# Patient Record
Sex: MALE | Race: WHITE | NOT HISPANIC OR LATINO | URBAN - METROPOLITAN AREA
[De-identification: names, ages, dates, MRNs, and addresses within clinical notes are randomized per-mention and may not be internally consistent; named-entity substitution may affect disease eponyms.]

---

## 2018-05-08 ENCOUNTER — OUTPATIENT (OUTPATIENT)
Dept: OUTPATIENT SERVICES | Facility: HOSPITAL | Age: 53
LOS: 1 days | End: 2018-05-08
Payer: COMMERCIAL

## 2018-05-08 VITALS
HEIGHT: 73 IN | RESPIRATION RATE: 16 BRPM | WEIGHT: 235.89 LBS | DIASTOLIC BLOOD PRESSURE: 66 MMHG | SYSTOLIC BLOOD PRESSURE: 122 MMHG | TEMPERATURE: 97 F | HEART RATE: 82 BPM

## 2018-05-08 DIAGNOSIS — Z01.818 ENCOUNTER FOR OTHER PREPROCEDURAL EXAMINATION: ICD-10-CM

## 2018-05-08 LAB
ANION GAP SERPL CALC-SCNC: 16 MMOL/L — SIGNIFICANT CHANGE UP (ref 5–17)
APTT BLD: 37.5 SEC — HIGH (ref 27.5–37.4)
BASOPHILS # BLD AUTO: 0 K/UL — SIGNIFICANT CHANGE UP (ref 0–0.2)
BASOPHILS NFR BLD AUTO: 0.1 % — SIGNIFICANT CHANGE UP (ref 0–2)
BUN SERPL-MCNC: 16 MG/DL — SIGNIFICANT CHANGE UP (ref 8–20)
CALCIUM SERPL-MCNC: 9.2 MG/DL — SIGNIFICANT CHANGE UP (ref 8.6–10.2)
CHLORIDE SERPL-SCNC: 102 MMOL/L — SIGNIFICANT CHANGE UP (ref 98–107)
CO2 SERPL-SCNC: 25 MMOL/L — SIGNIFICANT CHANGE UP (ref 22–29)
CREAT SERPL-MCNC: 0.98 MG/DL — SIGNIFICANT CHANGE UP (ref 0.5–1.3)
EOSINOPHIL # BLD AUTO: 0.1 K/UL — SIGNIFICANT CHANGE UP (ref 0–0.5)
EOSINOPHIL NFR BLD AUTO: 1 % — SIGNIFICANT CHANGE UP (ref 0–5)
GLUCOSE SERPL-MCNC: 129 MG/DL — HIGH (ref 70–115)
HCT VFR BLD CALC: 46.3 % — SIGNIFICANT CHANGE UP (ref 42–52)
HGB BLD-MCNC: 15.4 G/DL — SIGNIFICANT CHANGE UP (ref 14–18)
INR BLD: 1.04 RATIO — SIGNIFICANT CHANGE UP (ref 0.88–1.16)
LYMPHOCYTES # BLD AUTO: 2 K/UL — SIGNIFICANT CHANGE UP (ref 1–4.8)
LYMPHOCYTES # BLD AUTO: 28.7 % — SIGNIFICANT CHANGE UP (ref 20–55)
MCHC RBC-ENTMCNC: 29.5 PG — SIGNIFICANT CHANGE UP (ref 27–31)
MCHC RBC-ENTMCNC: 33.3 G/DL — SIGNIFICANT CHANGE UP (ref 32–36)
MCV RBC AUTO: 88.7 FL — SIGNIFICANT CHANGE UP (ref 80–94)
MONOCYTES # BLD AUTO: 0.5 K/UL — SIGNIFICANT CHANGE UP (ref 0–0.8)
MONOCYTES NFR BLD AUTO: 7 % — SIGNIFICANT CHANGE UP (ref 3–10)
NEUTROPHILS # BLD AUTO: 4.5 K/UL — SIGNIFICANT CHANGE UP (ref 1.8–8)
NEUTROPHILS NFR BLD AUTO: 62.9 % — SIGNIFICANT CHANGE UP (ref 37–73)
PLATELET # BLD AUTO: 233 K/UL — SIGNIFICANT CHANGE UP (ref 150–400)
POTASSIUM SERPL-MCNC: 3.8 MMOL/L — SIGNIFICANT CHANGE UP (ref 3.5–5.3)
POTASSIUM SERPL-SCNC: 3.8 MMOL/L — SIGNIFICANT CHANGE UP (ref 3.5–5.3)
PROTHROM AB SERPL-ACNC: 11.4 SEC — SIGNIFICANT CHANGE UP (ref 9.8–12.7)
RBC # BLD: 5.22 M/UL — SIGNIFICANT CHANGE UP (ref 4.6–6.2)
RBC # FLD: 13.2 % — SIGNIFICANT CHANGE UP (ref 11–15.6)
SODIUM SERPL-SCNC: 143 MMOL/L — SIGNIFICANT CHANGE UP (ref 135–145)
WBC # BLD: 7.1 K/UL — SIGNIFICANT CHANGE UP (ref 4.8–10.8)
WBC # FLD AUTO: 7.1 K/UL — SIGNIFICANT CHANGE UP (ref 4.8–10.8)

## 2018-05-08 PROCEDURE — 80048 BASIC METABOLIC PNL TOTAL CA: CPT

## 2018-05-08 PROCEDURE — 85610 PROTHROMBIN TIME: CPT

## 2018-05-08 PROCEDURE — 85730 THROMBOPLASTIN TIME PARTIAL: CPT

## 2018-05-08 PROCEDURE — 93005 ELECTROCARDIOGRAM TRACING: CPT

## 2018-05-08 PROCEDURE — 36415 COLL VENOUS BLD VENIPUNCTURE: CPT

## 2018-05-08 PROCEDURE — 93010 ELECTROCARDIOGRAM REPORT: CPT

## 2018-05-08 PROCEDURE — 85027 COMPLETE CBC AUTOMATED: CPT

## 2018-05-08 PROCEDURE — G0463: CPT

## 2018-05-08 RX ORDER — SODIUM CHLORIDE 9 MG/ML
3 INJECTION INTRAMUSCULAR; INTRAVENOUS; SUBCUTANEOUS ONCE
Qty: 0 | Refills: 0 | Status: DISCONTINUED | OUTPATIENT
Start: 2018-05-14 | End: 2018-05-29

## 2018-05-08 RX ORDER — CEFAZOLIN SODIUM 1 G
2000 VIAL (EA) INJECTION ONCE
Qty: 0 | Refills: 0 | Status: DISCONTINUED | OUTPATIENT
Start: 2018-05-14 | End: 2018-05-29

## 2018-05-08 RX ORDER — SODIUM CHLORIDE 9 MG/ML
1000 INJECTION, SOLUTION INTRAVENOUS
Qty: 0 | Refills: 0 | Status: DISCONTINUED | OUTPATIENT
Start: 2018-05-14 | End: 2018-05-29

## 2018-05-08 NOTE — H&P PST ADULT - RS GEN PE MLT RESP DETAILS PC
good air movement/respirations non-labored/airway patent/clear to auscultation bilaterally/breath sounds equal/no chest wall tenderness

## 2018-05-08 NOTE — ASU PATIENT PROFILE, ADULT - NS PRO MODE OF ARRIVAL
Patient comes in for programming evaluation for his defibrillator. All sensing and pacing parameters are within normal range. No changes need to be made at this time. Please see interrogation for more detail. Patient will follow up in 3 months in office or remotely.     SALLIE akhtar ambulatory

## 2018-05-08 NOTE — H&P PST ADULT - ASSESSMENT
53 year old male schedule for an umbilical hernia repair with possible mesh.     CAPRINI SCORE [CLOT]    AGE RELATED RISK FACTORS                                                       MOBILITY RELATED FACTORS  [x ] Age 41-60 years                                            (1 Point)                  [ ] Bed rest                                                        (1 Point)  [ ] Age: 61-74 years                                           (2 Points)                 [ ] Plaster cast                                                   (2 Points)  [ ] Age= 75 years                                              (3 Points)                 [ ] Bed bound for more than 72 hours                 (2 Points)    DISEASE RELATED RISK FACTORS                                               GENDER SPECIFIC FACTORS  [ ] Edema in the lower extremities                       (1 Point)                  [ ] Pregnancy                                                     (1 Point)  [ ] Varicose veins                                               (1 Point)                  [ ] Post-partum < 6 weeks                                   (1 Point)             [x ] BMI > 25 Kg/m2                                            (1 Point)                  [ ] Hormonal therapy  or oral contraception          (1 Point)                 [ ] Sepsis (in the previous month)                        (1 Point)                  [ ] History of pregnancy complications                 (1 point)  [ ] Pneumonia or serious lung disease                                               [ ] Unexplained or recurrent                     (1 Point)           (in the previous month)                               (1 Point)  [ ] Abnormal pulmonary function test                     (1 Point)                 SURGERY RELATED RISK FACTORS  [ ] Acute myocardial infarction                              (1 Point)                 [ ]  Section                                             (1 Point)  [ ] Congestive heart failure (in the previous month)  (1 Point)               [ ] Minor surgery                                                  (1 Point)   [ ] Inflammatory bowel disease                             (1 Point)                 [ ] Arthroscopic surgery                                        (2 Points)  [ ] Central venous access                                      (2 Points)                [ x] General surgery lasting more than 45 minutes   (2 Points)       [ ] Stroke (in the previous month)                          (5 Points)               [ ] Elective arthroplasty                                         (5 Points)                                                                                                                                               HEMATOLOGY RELATED FACTORS                                                 TRAUMA RELATED RISK FACTORS  [ ] Prior episodes of VTE                                     (3 Points)                 [ ] Fracture of the hip, pelvis, or leg                       (5 Points)  [ ] Positive family history for VTE                         (3 Points)                 [ ] Acute spinal cord injury (in the previous month)  (5 Points)  [ ] Prothrombin 75614 A                                     (3 Points)                 [ ] Paralysis  (less than 1 month)                             (5 Points)  [ ] Factor V Leiden                                             (3 Points)                  [ ] Multiple Trauma within 1 month                        (5 Points)  [ ] Lupus anticoagulants                                     (3 Points)                                                           [ ] Anticardiolipin antibodies                               (3 Points)                                                       [ ] High homocysteine in the blood                      (3 Points)                                             [ ] Other congenital or acquired thrombophilia      (3 Points)                                                [ ] Heparin induced thrombocytopenia                  (3 Points)                                          Total Score [ 4 ]

## 2018-05-08 NOTE — H&P PST ADULT - FAMILY HISTORY
Father  Still living? Unknown  Family history of stroke, Age at diagnosis: Age Unknown     Mother  Still living? Unknown  Family history of diabetes mellitus (DM), Age at diagnosis: Age Unknown

## 2018-05-08 NOTE — H&P PST ADULT - HISTORY OF PRESENT ILLNESS
53 year old male scheule for anumbilical hernia repari with possible mesh 53 year old male schedule for an umbilical hernia repair with possible mesh.

## 2018-05-13 ENCOUNTER — TRANSCRIPTION ENCOUNTER (OUTPATIENT)
Age: 53
End: 2018-05-13

## 2018-05-14 ENCOUNTER — RESULT REVIEW (OUTPATIENT)
Age: 53
End: 2018-05-14

## 2018-05-14 ENCOUNTER — OUTPATIENT (OUTPATIENT)
Dept: OUTPATIENT SERVICES | Facility: HOSPITAL | Age: 53
LOS: 1 days | End: 2018-05-14
Payer: COMMERCIAL

## 2018-05-14 VITALS
OXYGEN SATURATION: 96 % | HEART RATE: 68 BPM | RESPIRATION RATE: 16 BRPM | SYSTOLIC BLOOD PRESSURE: 130 MMHG | DIASTOLIC BLOOD PRESSURE: 70 MMHG

## 2018-05-14 DIAGNOSIS — Z98.890 OTHER SPECIFIED POSTPROCEDURAL STATES: Chronic | ICD-10-CM

## 2018-05-14 DIAGNOSIS — Z01.818 ENCOUNTER FOR OTHER PREPROCEDURAL EXAMINATION: ICD-10-CM

## 2018-05-14 DIAGNOSIS — K42.9 UMBILICAL HERNIA WITHOUT OBSTRUCTION OR GANGRENE: ICD-10-CM

## 2018-05-14 DIAGNOSIS — Z29.9 ENCOUNTER FOR PROPHYLACTIC MEASURES, UNSPECIFIED: ICD-10-CM

## 2018-05-14 PROCEDURE — 88302 TISSUE EXAM BY PATHOLOGIST: CPT | Mod: 26

## 2018-05-14 PROCEDURE — 49585: CPT

## 2018-05-14 PROCEDURE — 88302 TISSUE EXAM BY PATHOLOGIST: CPT

## 2018-05-14 RX ORDER — ONDANSETRON 8 MG/1
4 TABLET, FILM COATED ORAL ONCE
Qty: 0 | Refills: 0 | Status: DISCONTINUED | OUTPATIENT
Start: 2018-05-14 | End: 2018-05-14

## 2018-05-14 RX ORDER — OXYCODONE AND ACETAMINOPHEN 5; 325 MG/1; MG/1
1 TABLET ORAL
Qty: 12 | Refills: 0
Start: 2018-05-14

## 2018-05-14 RX ORDER — SODIUM CHLORIDE 9 MG/ML
1000 INJECTION, SOLUTION INTRAVENOUS
Qty: 0 | Refills: 0 | Status: DISCONTINUED | OUTPATIENT
Start: 2018-05-14 | End: 2018-05-14

## 2018-05-14 RX ORDER — FENTANYL CITRATE 50 UG/ML
50 INJECTION INTRAVENOUS
Qty: 0 | Refills: 0 | Status: DISCONTINUED | OUTPATIENT
Start: 2018-05-14 | End: 2018-05-14

## 2018-05-14 RX ADMIN — FENTANYL CITRATE 50 MICROGRAM(S): 50 INJECTION INTRAVENOUS at 12:39

## 2018-05-14 NOTE — ASU DISCHARGE PLAN (ADULT/PEDIATRIC). - MEDICATION SUMMARY - MEDICATIONS TO TAKE
I will START or STAY ON the medications listed below when I get home from the hospital:    Percocet 5/325 oral tablet  -- 1 tab(s) by mouth every 6 hours MDD:4  -- Caution federal law prohibits the transfer of this drug to any person other  than the person for whom it was prescribed.  May cause drowsiness.  Alcohol may intensify this effect.  Use care when operating dangerous machinery.  This prescription cannot be refilled.  This product contains acetaminophen.  Do not use  with any other product containing acetaminophen to prevent possible liver damage.  Using more of this medication than prescribed may cause serious breathing problems.    -- Indication: For pain

## 2018-05-14 NOTE — BRIEF OPERATIVE NOTE - POST-OP DX
Umbilical hernia without obstruction and without gangrene  05/14/2018  umbilical inclusion  Active  Rodri Maria

## 2018-05-14 NOTE — ASU DISCHARGE PLAN (ADULT/PEDIATRIC). - NOTIFY
Pain not relieved by Medications/Numbness, color, or temperature change to extremity/Persistent Nausea and Vomiting/Bleeding that does not stop

## 2018-05-14 NOTE — BRIEF OPERATIVE NOTE - PROCEDURE
<<-----Click on this checkbox to enter Procedure Umbilical herniorrhaphy using sutures  05/14/2018  excision intraabd inclusion  Active  RTUROFF

## 2018-05-22 LAB — SURGICAL PATHOLOGY FINAL REPORT - CH: SIGNIFICANT CHANGE UP

## 2020-04-06 NOTE — ASU PATIENT PROFILE, ADULT - ARRIVAL TIME
08:30

## 2020-10-09 NOTE — ASU DISCHARGE PLAN (ADULT/PEDIATRIC). - NS AS DC DISCHARGE ACCOMPANY
CERTIFICATE OF WORK    October 9, 2020      Re:   Magy Suazo  [id number] [address] [id number]                        This is to certify that Magy Suazo has been under my care from 10/9/2020 and is unable to return to work until further notice    RESTRICTIONS:  Patient is to self quarantine until COVID-19 test results are known      REMARKS:  Patient is out due to medical illness        SIGNATURE:___________________________________________,   10/9/2020      José Miguel Price MD        [address] [address] [place] [id number]  
Family

## 2021-04-06 NOTE — ASU PREOP CHECKLIST - ALLERGY BAND ON
Pt left a voice mail saying  "Can somebody call me"  Didn't give his name, , reason  Luckily the phone number was recorded  LMOM to please c/b if he needs Neuro  He has a Lois appt for later in the month 
no known allergies

## 2021-07-07 NOTE — ASU DISCHARGE PLAN (ADULT/PEDIATRIC). - LAUNCH MEDICATION RECONCILIATION
[address] [facility]  1949  [id number]    Have you had any Chest Pain that is not new? - No    Have you had any Shortness of Breath - No     Have you had any dizziness - No    Have you had any palpitations that are not new? - No    Do you have any edema - swelling in No      When did you have your last labs drawn   Where did you have them done   What doctor ordered     If we do not have these labs you are retrieve these labs for these providers!     Do you have a surgery or procedure scheduled in the near future - No <<-----Click here for Discharge Medication Review

## 2022-07-25 PROBLEM — E78.5 HYPERLIPIDEMIA, UNSPECIFIED: Chronic | Status: ACTIVE | Noted: 2018-05-08

## 2022-07-25 PROBLEM — M19.90 UNSPECIFIED OSTEOARTHRITIS, UNSPECIFIED SITE: Chronic | Status: ACTIVE | Noted: 2018-05-08

## 2022-08-12 ENCOUNTER — APPOINTMENT (OUTPATIENT)
Dept: ORTHOPEDIC SURGERY | Facility: CLINIC | Age: 57
End: 2022-08-12

## 2022-08-12 VITALS — HEIGHT: 73.5 IN | WEIGHT: 210 LBS | BODY MASS INDEX: 27.24 KG/M2

## 2022-08-12 PROCEDURE — 73030 X-RAY EXAM OF SHOULDER: CPT | Mod: LT

## 2022-08-12 PROCEDURE — 99204 OFFICE O/P NEW MOD 45 MIN: CPT

## 2022-08-12 NOTE — HISTORY OF PRESENT ILLNESS
[4] : 4 [Dull/Aching] : dull/aching [Constant] : constant [Meds] : meds [Heat] : heat [] : no [FreeTextEntry1] : left shoulder [FreeTextEntry5] : pt has been having left shoulder pain for years but has gotten more serious the past 2 years and more fatigued  [FreeTextEntry9] : aleve  [de-identified] : movement  [de-identified] : 03/2022 [de-identified] : , PCP [de-identified] : years ago [de-identified] : XR

## 2022-08-12 NOTE — IMAGING
[Left] : left shoulder [FreeTextEntry1] : There is a Type II Acromion.  There are AC changes.  The GH joint is OK.  There are GT changes on outside views.

## 2022-08-12 NOTE — REASON FOR VISIT
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  It can wake him at night.  He premedicates for golf with NSAIDs.  Reaching is sore and uncomfortable.  No numbness.

## 2022-08-12 NOTE — PHYSICAL EXAM
[Left] : left shoulder [Sitting] : sitting [Mild] : mild [5 ___] : forward flexion 5[unfilled]/5 [5___] : external rotation 5[unfilled]/5 [] : no sensory deficits [FreeTextEntry9] : IR to T10. [TWNoteComboBox4] : passive forward flexion 165 degrees [de-identified] : external rotation 75 degrees

## 2022-08-12 NOTE — CONSULT LETTER
[Dear  ___] : Dear  [unfilled], [Consult Letter:] : I had the pleasure of evaluating your patient, [unfilled]. [Please see my note below.] : Please see my note below. [Consult Closing:] : Thank you very much for allowing me to participate in the care of this patient.  If you have any questions, please do not hesitate to contact me. [Sincerely,] : Sincerely, [FreeTextEntry3] : Ben Duarte M.D.\par Shoulder Surgery\par

## 2022-08-12 NOTE — ASSESSMENT
[FreeTextEntry1] : We reviewed the findings and his options.\par His questions were answered.\par The Ac appears more of the issue.\par Medrol is prescribed.\par PT is planned.\par If sx persist, an injection vs MRI considered.

## 2022-09-26 ENCOUNTER — APPOINTMENT (OUTPATIENT)
Dept: ORTHOPEDIC SURGERY | Facility: CLINIC | Age: 57
End: 2022-09-26

## 2023-09-27 ENCOUNTER — APPOINTMENT (OUTPATIENT)
Dept: ORTHOPEDIC SURGERY | Facility: CLINIC | Age: 58
End: 2023-09-27
Payer: COMMERCIAL

## 2023-09-27 VITALS — HEIGHT: 73.5 IN | WEIGHT: 220 LBS | BODY MASS INDEX: 28.54 KG/M2

## 2023-09-27 PROCEDURE — 73030 X-RAY EXAM OF SHOULDER: CPT | Mod: LT

## 2023-09-27 PROCEDURE — 99214 OFFICE O/P EST MOD 30 MIN: CPT

## 2023-09-27 PROCEDURE — 73010 X-RAY EXAM OF SHOULDER BLADE: CPT | Mod: LT

## 2023-10-09 ENCOUNTER — APPOINTMENT (OUTPATIENT)
Dept: ORTHOPEDIC SURGERY | Facility: CLINIC | Age: 58
End: 2023-10-09
Payer: COMMERCIAL

## 2023-10-09 VITALS — HEIGHT: 73.5 IN | BODY MASS INDEX: 28.54 KG/M2 | WEIGHT: 220 LBS

## 2023-10-09 PROCEDURE — 99214 OFFICE O/P EST MOD 30 MIN: CPT

## 2023-10-20 ENCOUNTER — APPOINTMENT (OUTPATIENT)
Dept: ORTHOPEDIC SURGERY | Facility: CLINIC | Age: 58
End: 2023-10-20
Payer: COMMERCIAL

## 2023-10-20 PROCEDURE — L3670: CPT | Mod: LT

## 2023-10-24 ENCOUNTER — OUTPATIENT (OUTPATIENT)
Dept: OUTPATIENT SERVICES | Facility: HOSPITAL | Age: 58
LOS: 1 days | End: 2023-10-24
Payer: COMMERCIAL

## 2023-10-24 VITALS
WEIGHT: 229.94 LBS | RESPIRATION RATE: 15 BRPM | HEIGHT: 73 IN | HEART RATE: 71 BPM | TEMPERATURE: 98 F | OXYGEN SATURATION: 97 % | SYSTOLIC BLOOD PRESSURE: 133 MMHG | DIASTOLIC BLOOD PRESSURE: 82 MMHG

## 2023-10-24 DIAGNOSIS — M75.102 UNSPECIFIED ROTATOR CUFF TEAR OR RUPTURE OF LEFT SHOULDER, NOT SPECIFIED AS TRAUMATIC: ICD-10-CM

## 2023-10-24 DIAGNOSIS — M75.40 IMPINGEMENT SYNDROME OF UNSPECIFIED SHOULDER: ICD-10-CM

## 2023-10-24 DIAGNOSIS — Z98.890 OTHER SPECIFIED POSTPROCEDURAL STATES: Chronic | ICD-10-CM

## 2023-10-24 DIAGNOSIS — Z91.89 OTHER SPECIFIED PERSONAL RISK FACTORS, NOT ELSEWHERE CLASSIFIED: ICD-10-CM

## 2023-10-24 LAB
ANION GAP SERPL CALC-SCNC: 13 MMOL/L — SIGNIFICANT CHANGE UP (ref 7–14)
ANION GAP SERPL CALC-SCNC: 13 MMOL/L — SIGNIFICANT CHANGE UP (ref 7–14)
BUN SERPL-MCNC: 17 MG/DL — SIGNIFICANT CHANGE UP (ref 7–23)
BUN SERPL-MCNC: 17 MG/DL — SIGNIFICANT CHANGE UP (ref 7–23)
CALCIUM SERPL-MCNC: 9.5 MG/DL — SIGNIFICANT CHANGE UP (ref 8.4–10.5)
CALCIUM SERPL-MCNC: 9.5 MG/DL — SIGNIFICANT CHANGE UP (ref 8.4–10.5)
CHLORIDE SERPL-SCNC: 103 MMOL/L — SIGNIFICANT CHANGE UP (ref 98–107)
CHLORIDE SERPL-SCNC: 103 MMOL/L — SIGNIFICANT CHANGE UP (ref 98–107)
CO2 SERPL-SCNC: 27 MMOL/L — SIGNIFICANT CHANGE UP (ref 22–31)
CO2 SERPL-SCNC: 27 MMOL/L — SIGNIFICANT CHANGE UP (ref 22–31)
CREAT SERPL-MCNC: 1.13 MG/DL — SIGNIFICANT CHANGE UP (ref 0.5–1.3)
CREAT SERPL-MCNC: 1.13 MG/DL — SIGNIFICANT CHANGE UP (ref 0.5–1.3)
EGFR: 74 ML/MIN/1.73M2 — SIGNIFICANT CHANGE UP
EGFR: 74 ML/MIN/1.73M2 — SIGNIFICANT CHANGE UP
GLUCOSE SERPL-MCNC: 83 MG/DL — SIGNIFICANT CHANGE UP (ref 70–99)
GLUCOSE SERPL-MCNC: 83 MG/DL — SIGNIFICANT CHANGE UP (ref 70–99)
HCT VFR BLD CALC: 44.6 % — SIGNIFICANT CHANGE UP (ref 39–50)
HCT VFR BLD CALC: 44.6 % — SIGNIFICANT CHANGE UP (ref 39–50)
HGB BLD-MCNC: 14.8 G/DL — SIGNIFICANT CHANGE UP (ref 13–17)
HGB BLD-MCNC: 14.8 G/DL — SIGNIFICANT CHANGE UP (ref 13–17)
MCHC RBC-ENTMCNC: 29.8 PG — SIGNIFICANT CHANGE UP (ref 27–34)
MCHC RBC-ENTMCNC: 29.8 PG — SIGNIFICANT CHANGE UP (ref 27–34)
MCHC RBC-ENTMCNC: 33.2 GM/DL — SIGNIFICANT CHANGE UP (ref 32–36)
MCHC RBC-ENTMCNC: 33.2 GM/DL — SIGNIFICANT CHANGE UP (ref 32–36)
MCV RBC AUTO: 89.9 FL — SIGNIFICANT CHANGE UP (ref 80–100)
MCV RBC AUTO: 89.9 FL — SIGNIFICANT CHANGE UP (ref 80–100)
NRBC # BLD: 0 /100 WBCS — SIGNIFICANT CHANGE UP (ref 0–0)
NRBC # BLD: 0 /100 WBCS — SIGNIFICANT CHANGE UP (ref 0–0)
NRBC # FLD: 0 K/UL — SIGNIFICANT CHANGE UP (ref 0–0)
NRBC # FLD: 0 K/UL — SIGNIFICANT CHANGE UP (ref 0–0)
PLATELET # BLD AUTO: 262 K/UL — SIGNIFICANT CHANGE UP (ref 150–400)
PLATELET # BLD AUTO: 262 K/UL — SIGNIFICANT CHANGE UP (ref 150–400)
POTASSIUM SERPL-MCNC: 4.3 MMOL/L — SIGNIFICANT CHANGE UP (ref 3.5–5.3)
POTASSIUM SERPL-MCNC: 4.3 MMOL/L — SIGNIFICANT CHANGE UP (ref 3.5–5.3)
POTASSIUM SERPL-SCNC: 4.3 MMOL/L — SIGNIFICANT CHANGE UP (ref 3.5–5.3)
POTASSIUM SERPL-SCNC: 4.3 MMOL/L — SIGNIFICANT CHANGE UP (ref 3.5–5.3)
RBC # BLD: 4.96 M/UL — SIGNIFICANT CHANGE UP (ref 4.2–5.8)
RBC # BLD: 4.96 M/UL — SIGNIFICANT CHANGE UP (ref 4.2–5.8)
RBC # FLD: 12.5 % — SIGNIFICANT CHANGE UP (ref 10.3–14.5)
RBC # FLD: 12.5 % — SIGNIFICANT CHANGE UP (ref 10.3–14.5)
SODIUM SERPL-SCNC: 143 MMOL/L — SIGNIFICANT CHANGE UP (ref 135–145)
SODIUM SERPL-SCNC: 143 MMOL/L — SIGNIFICANT CHANGE UP (ref 135–145)
WBC # BLD: 6.98 K/UL — SIGNIFICANT CHANGE UP (ref 3.8–10.5)
WBC # BLD: 6.98 K/UL — SIGNIFICANT CHANGE UP (ref 3.8–10.5)
WBC # FLD AUTO: 6.98 K/UL — SIGNIFICANT CHANGE UP (ref 3.8–10.5)
WBC # FLD AUTO: 6.98 K/UL — SIGNIFICANT CHANGE UP (ref 3.8–10.5)

## 2023-10-24 PROCEDURE — 93010 ELECTROCARDIOGRAM REPORT: CPT

## 2023-10-24 NOTE — H&P PST ADULT - MUSCULOSKELETAL COMMENTS
pre op diagnosis - impingement of left shoulder b/l shoulder with pain and discomfort - L>R- patient failed conservative therapy

## 2023-10-24 NOTE — H&P PST ADULT - PATIENT ON (OXYGEN DELIVERY METHOD)
Counseled about the condition and also advised to monitor the symptoms and take ibuprofen for pain and also advised to go to ER or UC if symptoms get worse or do not improve,  
room air

## 2023-10-24 NOTE — H&P PST ADULT - NSICDXFAMILYHX_GEN_ALL_CORE_FT
FAMILY HISTORY:  Father  Still living? Unknown  Family history of stroke, Age at diagnosis: Age Unknown    Mother  Still living? Unknown  Family history of diabetes mellitus (DM), Age at diagnosis: Age Unknown

## 2023-10-24 NOTE — H&P PST ADULT - NSICDXPASTMEDICALHX_GEN_ALL_CORE_FT
PAST MEDICAL HISTORY:  HLD (hyperlipidemia)     Impingement syndrome of left shoulder     Lumbar herniated disc     OA (osteoarthritis)

## 2023-10-24 NOTE — H&P PST ADULT - HISTORY OF PRESENT ILLNESS
58 year old male, presents to [facility], with pre op diagnosis of  impingement syndrome of left shoulder, for pre op evaluation prior to decompression rotator cuff repair distal clavicle resection with Dr Chang

## 2023-10-24 NOTE — H&P PST ADULT - ASSESSMENT
58 year old male, presents to [facility], with pre op diagnosis of  impingement syndrome of left shoulder, for pre op evaluation prior to decompression rotator cuff repair distal clavicle resection with Dr Chang

## 2023-10-24 NOTE — H&P PST ADULT - PROBLEM SELECTOR PLAN 1
Patient is tentatively scheduled for  decompression rotator cuff repair distal clavicle resection with Dr Chang on 10/31/23.    Pre-op instructions provided. Pt given verbal and written instructions with teach back on chlorhexidine wash and pepcid. Pt verbalized understanding with return demonstration.    CBC, BMP sent

## 2023-10-30 ENCOUNTER — TRANSCRIPTION ENCOUNTER (OUTPATIENT)
Age: 58
End: 2023-10-30

## 2023-10-31 ENCOUNTER — TRANSCRIPTION ENCOUNTER (OUTPATIENT)
Age: 58
End: 2023-10-31

## 2023-10-31 ENCOUNTER — APPOINTMENT (OUTPATIENT)
Dept: ORTHOPEDIC SURGERY | Facility: AMBULATORY SURGERY CENTER | Age: 58
End: 2023-10-31
Payer: COMMERCIAL

## 2023-10-31 ENCOUNTER — OUTPATIENT (OUTPATIENT)
Dept: OUTPATIENT SERVICES | Facility: HOSPITAL | Age: 58
LOS: 1 days | Discharge: HOME | End: 2023-10-31

## 2023-10-31 VITALS
OXYGEN SATURATION: 100 % | HEART RATE: 91 BPM | RESPIRATION RATE: 21 BRPM | TEMPERATURE: 98 F | DIASTOLIC BLOOD PRESSURE: 80 MMHG | SYSTOLIC BLOOD PRESSURE: 130 MMHG

## 2023-10-31 VITALS
WEIGHT: 229.94 LBS | SYSTOLIC BLOOD PRESSURE: 147 MMHG | TEMPERATURE: 97 F | DIASTOLIC BLOOD PRESSURE: 80 MMHG | HEIGHT: 73 IN | HEART RATE: 69 BPM | OXYGEN SATURATION: 96 % | RESPIRATION RATE: 18 BRPM

## 2023-10-31 DIAGNOSIS — M75.102 UNSPECIFIED ROTATOR CUFF TEAR OR RUPTURE OF LEFT SHOULDER, NOT SPECIFIED AS TRAUMATIC: ICD-10-CM

## 2023-10-31 DIAGNOSIS — Z98.890 OTHER SPECIFIED POSTPROCEDURAL STATES: Chronic | ICD-10-CM

## 2023-10-31 PROCEDURE — 29824 SHO ARTHRS SRG DSTL CLAVICLC: CPT | Mod: 59,LT

## 2023-10-31 PROCEDURE — 29826 SHO ARTHRS SRG DECOMPRESSION: CPT | Mod: LT

## 2023-10-31 PROCEDURE — 29823 SHO ARTHRS SRG XTNSV DBRDMT: CPT | Mod: 59,LT

## 2023-10-31 PROCEDURE — 29827 SHO ARTHRS SRG RT8TR CUF RPR: CPT | Mod: LT

## 2023-10-31 DEVICE — ANCHOR PEEK SWIVLCK C 5.5X19.1MM: Type: IMPLANTABLE DEVICE | Site: LEFT | Status: FUNCTIONAL

## 2023-10-31 DEVICE — ANCHOR HEALIX ADVANCE 3 PEEK 5.5MM: Type: IMPLANTABLE DEVICE | Site: LEFT | Status: FUNCTIONAL

## 2023-10-31 RX ORDER — OXYCODONE AND ACETAMINOPHEN 5; 325 MG/1; MG/1
1 TABLET ORAL
Qty: 42 | Refills: 0
Start: 2023-10-31

## 2023-10-31 NOTE — ASU DISCHARGE PLAN (ADULT/PEDIATRIC) - CARE PROVIDER_API CALL
Ben Cook  Orthopaedic Surgery  [address] [place] [id number]  Phone: [phone]  Fax: [phone]  Follow Up Time: 1 week

## 2023-10-31 NOTE — ASU DISCHARGE PLAN (ADULT/PEDIATRIC) - NS MD DC FALL RISK RISK
For information on Fall & Injury Prevention, visit: [email or web address] OR  [email or web address] OR  https://www.cdc.gov/steadi/patient.html

## 2023-10-31 NOTE — ASU DISCHARGE PLAN (ADULT/PEDIATRIC) - MODE OF TRANSPORTATION
Initial OB;    Soy Zhu is 29 y.o.  female ,Patient's last menstrual period was 2021. at 17w3d. She denies current complaints.    Past OB history-  OB History    Para Term  AB Living   3 2 2     2   SAB TAB Ectopic Molar Multiple Live Births           0 2      # Outcome Date GA Lbr Rey/2nd Weight Sex Delivery Anes PTL Lv   3 Current            2 Term 20 39w4d / 00:05 3.825 kg (8 lb 6.9 oz) F Vag-Spont None N NASREEN   1 Term 10/14/12 38w0d  3.033 kg (6 lb 11 oz) F Vag-Spont None  NASREEN      Birth Comments: System Generated. Please review and update pregnancy details.     Past medical history-  Past Medical History:   Diagnosis Date   • Anemia    • ASTHMA     exercise-induced asthma   • Nose trouble     nose bleeds     Past surgical history-History reviewed. No pertinent surgical history.  Allergies-Penicillins  Medications-  No current facility-administered medications for this visit.     Last reviewed on 2021  2:50 PM by Jaiden Phillip M.D.        Size equals dates, positive fetal heart tones    See prenatal physical;    Impression;  Viable pregnancy at 16 weeks    Plan;  Discussed proper diet/hydration during pregnancy  Discussed exercise recommendations during pregnancy  Patient offered cystic fibrosis mutation carrier screening and spinal muscular atrophy carrier screening  Discussed first trimester screening which includes nuchal translucency/nasal bone screening along with GRAY-A and free beta hCG  Discussed cell free DNA testing  She would like AFP tetra testing-lab slip given  Prenatal labs today  Followup in 4 weeks    Female chaperone present during entire history and physical examination   Wheelchair/Stroller

## 2023-10-31 NOTE — ASU DISCHARGE PLAN (ADULT/PEDIATRIC) - ASU DC SPECIAL INSTRUCTIONSFT
Please follow the instructions provided to you by Dr. Cook regarding post operative care and follow up.  If you were told that you would be taking prescribed pain control (i.e. opiates/narcotics) they have been sent to your pharmacy.  If not then please take over the counter pain medication (i.e. Tylenol/Advil) as instructed on the labels

## 2023-11-10 ENCOUNTER — APPOINTMENT (OUTPATIENT)
Dept: ORTHOPEDIC SURGERY | Facility: CLINIC | Age: 58
End: 2023-11-10
Payer: COMMERCIAL

## 2023-11-10 PROBLEM — M51.26 OTHER INTERVERTEBRAL DISC DISPLACEMENT, LUMBAR REGION: Chronic | Status: ACTIVE | Noted: 2023-10-24

## 2023-11-10 PROBLEM — M75.42 IMPINGEMENT SYNDROME OF LEFT SHOULDER: Chronic | Status: ACTIVE | Noted: 2023-10-24

## 2023-11-10 PROCEDURE — 99024 POSTOP FOLLOW-UP VISIT: CPT

## 2023-11-10 PROCEDURE — 73030 X-RAY EXAM OF SHOULDER: CPT | Mod: LT

## 2023-12-15 ENCOUNTER — APPOINTMENT (OUTPATIENT)
Dept: ORTHOPEDIC SURGERY | Facility: CLINIC | Age: 58
End: 2023-12-15
Payer: COMMERCIAL

## 2023-12-15 VITALS — WEIGHT: 220 LBS | HEIGHT: 73.5 IN | BODY MASS INDEX: 28.54 KG/M2

## 2023-12-15 PROCEDURE — 99024 POSTOP FOLLOW-UP VISIT: CPT

## 2023-12-15 NOTE — ASSESSMENT
[FreeTextEntry1] : Course outlined. Discontinue sling use. More PT is prescribed. He will call if he needs another PT script.  Cautions advised. Questions addressed.  Patient seen by Ben Cook Shoulder Surgery  The documentation recorded by the scribe accurately reflects the service I personally performed and the decisions made by me. Entered by Leonardo Huggins acting as scribe.

## 2023-12-15 NOTE — PHYSICAL EXAM
[Left] : left shoulder [Sitting] : sitting [] : no sensory deficits [de-identified] : Strength was not assessed. [TWNoteComboBox4] : passive forward flexion 140 degrees [de-identified] : external rotation 40 degrees

## 2023-12-15 NOTE — REASON FOR VISIT
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  DOS: 10/31/2023 Procedure: Left Shoulder Arthroscopy, Glenohumeral Debridement, Subacromial Decompression, Medium Rotator Cuff Repair (DR), Distal Clavicle Resection Diagnosis: Subacromial Impingement, Medium Rotator Cuff Tear, AC Arthralgia, Shoulder Pain, Glenohumeral Synovitis, SLAP Tear, Partial Posterior Labral Tear  He has been attending PT.  He has been making gains.

## 2024-02-09 ENCOUNTER — APPOINTMENT (OUTPATIENT)
Dept: ORTHOPEDIC SURGERY | Facility: CLINIC | Age: 59
End: 2024-02-09
Payer: COMMERCIAL

## 2024-02-09 VITALS — WEIGHT: 220 LBS | HEIGHT: 73.5 IN | BODY MASS INDEX: 28.54 KG/M2

## 2024-02-09 PROCEDURE — 99214 OFFICE O/P EST MOD 30 MIN: CPT

## 2024-02-09 NOTE — PHYSICAL EXAM
[Left] : left shoulder [Sitting] : sitting [5 ___] : forward flexion 5[unfilled]/5 [] : no sensory deficits [TWNoteComboBox4] : passive forward flexion 150 degrees [de-identified] : external rotation 50 degrees

## 2024-02-09 NOTE — HISTORY OF PRESENT ILLNESS
[de-identified] : Here for left shoulder follow up. Continuing PT. Doing well.  [de-identified] : PT

## 2024-02-09 NOTE — ASSESSMENT
[FreeTextEntry1] : Course outlined. More PT is prescribed. Discussed his healing timeline.  Cautions advised. Questions addressed.  Patient seen by Ben Cook Shoulder Surgery  The documentation recorded by the scribe accurately reflects the service I personally performed and the decisions made by me. Entered by Leonardo Huggins acting as scribe.

## 2024-04-19 ENCOUNTER — APPOINTMENT (OUTPATIENT)
Dept: ORTHOPEDIC SURGERY | Facility: CLINIC | Age: 59
End: 2024-04-19
Payer: COMMERCIAL

## 2024-04-19 VITALS — BODY MASS INDEX: 28.54 KG/M2 | HEIGHT: 73.5 IN | WEIGHT: 220 LBS

## 2024-04-19 DIAGNOSIS — M25.512 PAIN IN LEFT SHOULDER: ICD-10-CM

## 2024-04-19 DIAGNOSIS — M75.42 IMPINGEMENT SYNDROME OF LEFT SHOULDER: ICD-10-CM

## 2024-04-19 DIAGNOSIS — M75.102 UNSPECIFIED ROTATOR CUFF TEAR OR RUPTURE OF LEFT SHOULDER, NOT SPECIFIED AS TRAUMATIC: ICD-10-CM

## 2024-04-19 PROCEDURE — 99214 OFFICE O/P EST MOD 30 MIN: CPT

## 2024-05-21 ENCOUNTER — INPATIENT (INPATIENT)
Facility: HOSPITAL | Age: 59
LOS: 1 days | Discharge: HOME | DRG: 63 | End: 2024-05-23
Attending: STUDENT IN AN ORGANIZED HEALTH CARE EDUCATION/TRAINING PROGRAM | Admitting: STUDENT IN AN ORGANIZED HEALTH CARE EDUCATION/TRAINING PROGRAM
Payer: COMMERCIAL

## 2024-05-21 ENCOUNTER — NON-APPOINTMENT (OUTPATIENT)
Age: 59
End: 2024-05-21

## 2024-05-21 ENCOUNTER — APPOINTMENT (OUTPATIENT)
Dept: CARDIOLOGY | Facility: CLINIC | Age: 59
End: 2024-05-21
Payer: COMMERCIAL

## 2024-05-21 ENCOUNTER — RESULT REVIEW (OUTPATIENT)
Age: 59
End: 2024-05-21

## 2024-05-21 VITALS
HEART RATE: 82 BPM | DIASTOLIC BLOOD PRESSURE: 74 MMHG | SYSTOLIC BLOOD PRESSURE: 121 MMHG | OXYGEN SATURATION: 96 % | BODY MASS INDEX: 31.78 KG/M2 | HEIGHT: 73.5 IN | WEIGHT: 245 LBS

## 2024-05-21 VITALS
HEART RATE: 77 BPM | OXYGEN SATURATION: 100 % | WEIGHT: 233.69 LBS | DIASTOLIC BLOOD PRESSURE: 83 MMHG | TEMPERATURE: 97 F | RESPIRATION RATE: 16 BRPM | HEIGHT: 73 IN | SYSTOLIC BLOOD PRESSURE: 141 MMHG

## 2024-05-21 DIAGNOSIS — R07.89 OTHER CHEST PAIN: ICD-10-CM

## 2024-05-21 DIAGNOSIS — I63.9 CEREBRAL INFARCTION, UNSPECIFIED: ICD-10-CM

## 2024-05-21 DIAGNOSIS — E78.5 HYPERLIPIDEMIA, UNSPECIFIED: ICD-10-CM

## 2024-05-21 DIAGNOSIS — Z98.890 OTHER SPECIFIED POSTPROCEDURAL STATES: Chronic | ICD-10-CM

## 2024-05-21 LAB
A1C WITH ESTIMATED AVERAGE GLUCOSE RESULT: 6.3 % — HIGH (ref 4–5.6)
ALBUMIN SERPL ELPH-MCNC: 4.7 G/DL — SIGNIFICANT CHANGE UP (ref 3.3–5.2)
ALP SERPL-CCNC: 66 U/L — SIGNIFICANT CHANGE UP (ref 40–120)
ALT FLD-CCNC: 39 U/L — SIGNIFICANT CHANGE UP
ANION GAP SERPL CALC-SCNC: 13 MMOL/L — SIGNIFICANT CHANGE UP (ref 5–17)
APTT BLD: 37.5 SEC — HIGH (ref 24.5–35.6)
AST SERPL-CCNC: 33 U/L — SIGNIFICANT CHANGE UP
BASOPHILS # BLD AUTO: 0.04 K/UL — SIGNIFICANT CHANGE UP (ref 0–0.2)
BASOPHILS NFR BLD AUTO: 0.5 % — SIGNIFICANT CHANGE UP (ref 0–2)
BILIRUB SERPL-MCNC: 1.1 MG/DL — SIGNIFICANT CHANGE UP (ref 0.4–2)
BUN SERPL-MCNC: 14.8 MG/DL — SIGNIFICANT CHANGE UP (ref 8–20)
CALCIUM SERPL-MCNC: 9.5 MG/DL — SIGNIFICANT CHANGE UP (ref 8.4–10.5)
CHLORIDE SERPL-SCNC: 103 MMOL/L — SIGNIFICANT CHANGE UP (ref 96–108)
CHOLEST SERPL-MCNC: 187 MG/DL — SIGNIFICANT CHANGE UP
CK SERPL-CCNC: 140 U/L — SIGNIFICANT CHANGE UP (ref 30–200)
CO2 SERPL-SCNC: 24 MMOL/L — SIGNIFICANT CHANGE UP (ref 22–29)
CREAT SERPL-MCNC: 1.01 MG/DL — SIGNIFICANT CHANGE UP (ref 0.5–1.3)
EGFR: 85 ML/MIN/1.73M2 — SIGNIFICANT CHANGE UP
EOSINOPHIL # BLD AUTO: 0.12 K/UL — SIGNIFICANT CHANGE UP (ref 0–0.5)
EOSINOPHIL NFR BLD AUTO: 1.6 % — SIGNIFICANT CHANGE UP (ref 0–6)
ESTIMATED AVERAGE GLUCOSE: 134 MG/DL — HIGH (ref 68–114)
GLUCOSE SERPL-MCNC: 96 MG/DL — SIGNIFICANT CHANGE UP (ref 70–99)
HCT VFR BLD CALC: 46.5 % — SIGNIFICANT CHANGE UP (ref 39–50)
HDLC SERPL-MCNC: 39 MG/DL — LOW
HGB BLD-MCNC: 15.6 G/DL — SIGNIFICANT CHANGE UP (ref 13–17)
IMM GRANULOCYTES NFR BLD AUTO: 0.3 % — SIGNIFICANT CHANGE UP (ref 0–0.9)
INR BLD: 1.02 RATIO — SIGNIFICANT CHANGE UP (ref 0.85–1.18)
LIPID PNL WITH DIRECT LDL SERPL: 115 MG/DL — HIGH
LYMPHOCYTES # BLD AUTO: 2.27 K/UL — SIGNIFICANT CHANGE UP (ref 1–3.3)
LYMPHOCYTES # BLD AUTO: 29.9 % — SIGNIFICANT CHANGE UP (ref 13–44)
MCHC RBC-ENTMCNC: 29.4 PG — SIGNIFICANT CHANGE UP (ref 27–34)
MCHC RBC-ENTMCNC: 33.5 GM/DL — SIGNIFICANT CHANGE UP (ref 32–36)
MCV RBC AUTO: 87.7 FL — SIGNIFICANT CHANGE UP (ref 80–100)
MONOCYTES # BLD AUTO: 0.68 K/UL — SIGNIFICANT CHANGE UP (ref 0–0.9)
MONOCYTES NFR BLD AUTO: 9 % — SIGNIFICANT CHANGE UP (ref 2–14)
NEUTROPHILS # BLD AUTO: 4.45 K/UL — SIGNIFICANT CHANGE UP (ref 1.8–7.4)
NEUTROPHILS NFR BLD AUTO: 58.7 % — SIGNIFICANT CHANGE UP (ref 43–77)
NON HDL CHOLESTEROL: 148 MG/DL — HIGH
PLATELET # BLD AUTO: 258 K/UL — SIGNIFICANT CHANGE UP (ref 150–400)
POTASSIUM SERPL-MCNC: 4.4 MMOL/L — SIGNIFICANT CHANGE UP (ref 3.5–5.3)
POTASSIUM SERPL-SCNC: 4.4 MMOL/L — SIGNIFICANT CHANGE UP (ref 3.5–5.3)
PROT SERPL-MCNC: 7.2 G/DL — SIGNIFICANT CHANGE UP (ref 6.6–8.7)
PROTHROM AB SERPL-ACNC: 11.3 SEC — SIGNIFICANT CHANGE UP (ref 9.5–13)
RBC # BLD: 5.3 M/UL — SIGNIFICANT CHANGE UP (ref 4.2–5.8)
RBC # FLD: 13 % — SIGNIFICANT CHANGE UP (ref 10.3–14.5)
SODIUM SERPL-SCNC: 139 MMOL/L — SIGNIFICANT CHANGE UP (ref 135–145)
T3 SERPL-MCNC: 77 NG/DL — LOW (ref 80–200)
T4 AB SER-ACNC: 6 UG/DL — SIGNIFICANT CHANGE UP (ref 4.5–12)
TRIGL SERPL-MCNC: 165 MG/DL — HIGH
TROPONIN T, HIGH SENSITIVITY RESULT: 7 NG/L — SIGNIFICANT CHANGE UP (ref 0–51)
TSH SERPL-MCNC: 0.99 UIU/ML — SIGNIFICANT CHANGE UP (ref 0.27–4.2)
WBC # BLD: 7.58 K/UL — SIGNIFICANT CHANGE UP (ref 3.8–10.5)
WBC # FLD AUTO: 7.58 K/UL — SIGNIFICANT CHANGE UP (ref 3.8–10.5)

## 2024-05-21 PROCEDURE — 70498 CT ANGIOGRAPHY NECK: CPT | Mod: 26,MC

## 2024-05-21 PROCEDURE — 93970 EXTREMITY STUDY: CPT | Mod: 26

## 2024-05-21 PROCEDURE — 0042T: CPT | Mod: MC

## 2024-05-21 PROCEDURE — 93306 TTE W/DOPPLER COMPLETE: CPT | Mod: 26

## 2024-05-21 PROCEDURE — 93000 ELECTROCARDIOGRAM COMPLETE: CPT

## 2024-05-21 PROCEDURE — 70450 CT HEAD/BRAIN W/O DYE: CPT | Mod: 26,59,MC

## 2024-05-21 PROCEDURE — 70496 CT ANGIOGRAPHY HEAD: CPT | Mod: 26,MC

## 2024-05-21 PROCEDURE — 99222 1ST HOSP IP/OBS MODERATE 55: CPT

## 2024-05-21 PROCEDURE — 99204 OFFICE O/P NEW MOD 45 MIN: CPT

## 2024-05-21 PROCEDURE — 99291 CRITICAL CARE FIRST HOUR: CPT

## 2024-05-21 PROCEDURE — 93010 ELECTROCARDIOGRAM REPORT: CPT

## 2024-05-21 PROCEDURE — 71045 X-RAY EXAM CHEST 1 VIEW: CPT | Mod: 26

## 2024-05-21 RX ORDER — GABAPENTIN 300 MG/1
300 CAPSULE ORAL 3 TIMES DAILY
Qty: 15 | Refills: 0 | Status: DISCONTINUED | COMMUNITY
Start: 2023-10-25 | End: 2024-05-21

## 2024-05-21 RX ORDER — ONDANSETRON 4 MG/1
4 TABLET, ORALLY DISINTEGRATING ORAL EVERY 4 HOURS
Qty: 20 | Refills: 0 | Status: DISCONTINUED | COMMUNITY
Start: 2023-11-01 | End: 2024-05-21

## 2024-05-21 RX ORDER — CHLORHEXIDINE GLUCONATE 213 G/1000ML
1 SOLUTION TOPICAL DAILY
Refills: 0 | Status: DISCONTINUED | OUTPATIENT
Start: 2024-05-21 | End: 2024-05-23

## 2024-05-21 RX ORDER — SODIUM CHLORIDE 9 MG/ML
10 INJECTION INTRAMUSCULAR; INTRAVENOUS; SUBCUTANEOUS ONCE
Refills: 0 | Status: COMPLETED | OUTPATIENT
Start: 2024-05-21 | End: 2024-05-21

## 2024-05-21 RX ORDER — SODIUM CHLORIDE 9 MG/ML
1000 INJECTION INTRAMUSCULAR; INTRAVENOUS; SUBCUTANEOUS
Refills: 0 | Status: DISCONTINUED | OUTPATIENT
Start: 2024-05-21 | End: 2024-05-22

## 2024-05-21 RX ORDER — TENECTEPLASE 50 MG
25 KIT INTRAVENOUS ONCE
Refills: 0 | Status: COMPLETED | OUTPATIENT
Start: 2024-05-21 | End: 2024-05-21

## 2024-05-21 RX ORDER — METHYLPREDNISOLONE 4 MG/1
4 TABLET ORAL
Qty: 1 | Refills: 0 | Status: DISCONTINUED | COMMUNITY
Start: 2022-08-12 | End: 2024-05-21

## 2024-05-21 RX ORDER — HYDROCODONE BITARTRATE AND ACETAMINOPHEN 7.5; 325 MG/1; MG/1
7.5-325 TABLET ORAL
Qty: 42 | Refills: 0 | Status: DISCONTINUED | COMMUNITY
Start: 2023-10-25 | End: 2024-05-21

## 2024-05-21 RX ORDER — KETOROLAC TROMETHAMINE 10 MG/1
10 TABLET, FILM COATED ORAL EVERY 6 HOURS
Qty: 20 | Refills: 0 | Status: DISCONTINUED | COMMUNITY
Start: 2023-10-25 | End: 2024-05-21

## 2024-05-21 RX ADMIN — SODIUM CHLORIDE 10 MILLILITER(S): 9 INJECTION INTRAMUSCULAR; INTRAVENOUS; SUBCUTANEOUS at 17:31

## 2024-05-21 RX ADMIN — CHLORHEXIDINE GLUCONATE 1 APPLICATION(S): 213 SOLUTION TOPICAL at 22:58

## 2024-05-21 RX ADMIN — SODIUM CHLORIDE 60 MILLILITER(S): 9 INJECTION INTRAMUSCULAR; INTRAVENOUS; SUBCUTANEOUS at 22:51

## 2024-05-21 RX ADMIN — TENECTEPLASE 3600 MILLIGRAM(S): KIT at 17:27

## 2024-05-21 RX ADMIN — SODIUM CHLORIDE 10 MILLILITER(S): 9 INJECTION INTRAMUSCULAR; INTRAVENOUS; SUBCUTANEOUS at 17:27

## 2024-05-21 NOTE — ED ADULT NURSE REASSESSMENT NOTE - NS ED NURSE REASSESS COMMENT FT1
Report given to receiving RN Della in ICU, pt placed on portable monitor, awaiting transport to floor, pt aware of plan of care.

## 2024-05-21 NOTE — REVIEW OF SYSTEMS
[Feeling Fatigued] : feeling fatigued [Chest Discomfort] : chest discomfort [Numbness (Hypoesthesia)] : numbness [Tingling (Paresthesia)] : tingling [Negative] : Heme/Lymph

## 2024-05-21 NOTE — HISTORY OF PRESENT ILLNESS
[FreeTextEntry1] : Rodri is a 59-year-old male here for initial evaluation.  Overall, he has been generally healthy, and does not take any medications.  His recent blood work demonstrated an A1c of 6.8, and an LDL of 151.  His father had significant cardiac issues, with multiple CVA/MI and was disabled by the age of 52, passing away at 78.  His mother had diabetes.  He works as an , and is not particularly active.  He reports 2 issues as of late.  One of these issues is intermittent chest discomfort, along with some dyspnea on exertion, which he attributes to be out of shape. He also reports a few episodes of loss of sensation in his left hand, while at work, late at night.  He felt that he was dizzy, and may pass out during these episodes.  He also reports a headache for many days.  The chest discomfort is not directly related to these dizzy spells.

## 2024-05-21 NOTE — PROGRESS NOTE ADULT - CRITICAL CARE ATTENDING COMMENT
I spent 45 minutes of critical care time examining patient, reviewing vitals, labs, medications, imaging and discussing with the team goals of care to prevent life-threatening in this patient who is at high risk for deterioration or death due to:  stroke

## 2024-05-21 NOTE — ED ADULT TRIAGE NOTE - CHIEF COMPLAINT QUOTE
pt states @ 330pm he began to slur words, have difficulty swallowing and left arm and leg weakness. symptoms resolved after 15 mins. code stroke activated, pt taken to ct

## 2024-05-21 NOTE — H&P ADULT - HISTORY OF PRESENT ILLNESS
HPI: 59-year-old male with no signal past medical history presents with left-sided weakness.  Patient reports that he was driving approximate 2:30 PM when he had sudden onset of numbness to his left lower leg.  The symptoms lasted for several minutes and then resolved back to baseline.  He reports that at 3:30 PM while talking on the phone he was noted to be slurring his words.  He reports that he had weakness to left side of his face and his lunch was dribbling out of the left side of his mouth.  He reports associated numbness to his left hand.  He called his wife to bring him to the emergency room, reports that while in route his slurred speech began to  resolved and is currently back to baseline. Patient received TNK @ 1723. CTA showing RM1 thrombus with distal flow intact, CTP showing large at risk territory      Vital Signs Last 24 Hrs  T(C): 36.3 (21 May 2024 16:45), Max: 36.3 (21 May 2024 16:45)  T(F): 97.4 (21 May 2024 16:45), Max: 97.4 (21 May 2024 16:45)  HR: 70 (21 May 2024 17:40) (70 - 77)  BP: 155/94 (21 May 2024 17:40) (141/83 - 155/94)  BP(mean): --  RR: 16 (21 May 2024 17:40) (16 - 18)  SpO2: 97% (21 May 2024 17:40) (97% - 100%)    Parameters below as of 21 May 2024 17:40  Patient On (Oxygen Delivery Method): room air        PHYSICAL EXAM:  GENERAL: no acute distress  HEAD:  Atraumatic, normocephalic  NEURO: AOx3, PERRLA, EOMI, L sided facial droop, FC, DUCKWORTH 5/5 no drift, LUE numbness  CHEST/LUNG: Clear to auscultation bilaterally; no rales, rhonchi, wheezing, or rubs  ABDOMEN: Soft, nontender, nondistended;   EXTREMITIES:  2+ peripheral pulses, no clubbing, cyanosis, or edema  SKIN: Warm, dry; no rashes or lesions      LABS:                        15.6   7.58  )-----------( 258      ( 21 May 2024 16:52 )             46.5     05-21    139  |  103  |  14.8  ----------------------------<  96  4.4   |  24.0  |  1.01    Ca    9.5      21 May 2024 16:52    TPro  7.2  /  Alb  4.7  /  TBili  1.1  /  DBili  x   /  AST  33  /  ALT  39  /  AlkPhos  66  05-21    PT/INR - ( 21 May 2024 16:52 )   PT: 11.3 sec;   INR: 1.02 ratio         PTT - ( 21 May 2024 16:52 )  PTT:37.5 sec  Urinalysis Basic - ( 21 May 2024 16:52 )    Color: x / Appearance: x / SG: x / pH: x  Gluc: 96 mg/dL / Ketone: x  / Bili: x / Urobili: x   Blood: x / Protein: x / Nitrite: x   Leuk Esterase: x / RBC: x / WBC x   Sq Epi: x / Non Sq Epi: x / Bacteria: x    NIH Stroke Scale:   · NIH Stroke Scale: LOC	(0) Alert; keenly responsive  · NIH Stroke Scale: LOC Question	(0) Answers both questions correctly  · NIH Stroke Scale: LOC Command	(0) Performs both tasks correctly  · NIH Stroke Scale: Gaze	(0) Normal  · NIH Stroke Scale: Visual	(0) No visual loss  · NIH Stroke Scale: Facial	(1) Minor paralysis (flattened nasolabial fold, asymmetry on smiling)  · NIH Stroke Scale: Arm Left	(0) No drift; limb holds 90 (or 45) degrees for full 10 secs  · NIH Stroke Scale: Arm Right	(0) No drift; limb holds 90 (or 45) degrees for full 10 secs  · NIH Stroke Scale: Leg Left	(0) No drift; leg holds 30 degree position for full 5 secs  · NIH Stroke Scale: Leg Right	(0) No drift; leg holds 30 degree position for full 5 secs  · NIH Stroke Scale: Ataxia	(0) Absent  · NIH Stroke Scale: Sensory	(1) Mild-to-moderate sensory loss; patient feels pinprick is less sharp or is dull on the affected side; or there is a loss of superficial pain with pinprick, but patient is aware of being touched  · NIH Stroke Scale: Language	(0) No aphasia; normal  · NIH Stroke Scale: Dysarthria	(0) Normal  · NIH Stroke Scale: Extinct Inattention	(0) No abnormality  · NIH Stroke Scale: Total	2    GCS: 15  Pre-MRS: 0

## 2024-05-21 NOTE — CONSULT NOTE ADULT - ASSESSMENT
ASSESSMENT:   59y male with no PMH presented to the [facility] ED on 05/21/24 for transient left sided weakness, numbness and facial droop. Upon arrival to the ED, NIHSS was noted to be 2 for mild loss of nasolabial fold on the left and mild left upper extremity numbness.  CT Head was unrevealing for any acute pathology, CTA H/N revealed a  focal thrombus in Right M1 with distal flow although reduced compared to the left side and CT perfusion revealed 6 ml penumbra in the right MCA region. TNK was administered at 1727. The stroke Attending Dr. Vance and Neuro IR Dr. Mosquera discussed and decided that emergent mechanical thrombectomy intervention was not required at this time given the rapidly improving symptoms and low NIHSS. However should the patient clinically worsened, he may be taken to the IR suite for thrombectomy.     IMPRESSION: Etiology at this time concerning for embolic stroke of unknown origin- although cardioembolic and hypercoagulable state can not be excluded at this time. Further recommendations pending workup.     NEURO:   -Neurologically back to baseline   -Continue close monitoring for neurologic deterioration    - Stroke neuro checks q 1hr   - Permissive HTN up to 180 - per post thrombolytic administration protocol    -ANTITHROMBOTIC THERAPY: hold given thrombolytic administration   -titrate statin to LDL goal less than 70  -Rpt CTH 24hr - per post thrombolytic administration protocol   -MRI Brain w/o pending   -Dysphagia screen: pass/fail - pending   -Physical therapy/OT/Speech eval/treatment.   -check TTE, cardiac monitoring w/ telemetry for now, further evaluation pending findings of noted workup          -patient should have all age and risk appropriate malignancy screenings with PCP or sooner if clinically suspected   -DVT ppx:  SCD for now    -maintain adequate hydration    -Na Goal: 135-145   -monitor for si/sx of infection   -Check LDL/A1C  -Stroke education     OTHER:  condition and plan of care d/w patient, questions and concerns addressed.     DISPOSITION: Rehab or home depending on PT eval once stable and workup is complete      CORE MEASURES:        Admission NIHSS: 2     Tenecteplase : [x] YES [] NO      LDL/A1C: pending      Depression Screen- if depression hx and/or present      Statin Therapy:     Dysphagia Screen: [] PASS [] FAIL     Smoking [] YES [] NO      Afib [] YES [x] NO     Stroke Education [] YES [] NO

## 2024-05-21 NOTE — H&P ADULT - ASSESSMENT
ASSESSMENT/PLAN: 59M s/p TNK    NEURO:  Neuro/Vital checks per stroke protocol  CT 24hrs post TNK  MR @ somepoint  Pain control- PRN  Activity: [] OOB as tolerated [] Bedrest [x] PT [x] OT [] PMNR    PULM: RA  Incentive Spirometry as tolerated    CV:  SBP goal: 140-180    RENAL:  Fluids: NS @ 60     GI:  Diet: Dysphagia and advanced as donald but NPO until 12hrs post TNK  GI prophylaxis [x] not indicated [] PPI [] other:  Bowel regimen [] colace [x] senna [] other: Miralax    ENDO:   Goal euglycemia (-180)  ISS while NPO    HEME/ONC:  VTE prophylaxis: [x] SCDs [x] hold chemoprophylaxis     ID: Afebrile  Monitor Fever Curve    CODE STATUS:  [x] Full Code [] DNR [] DNI [] Palliative/Comfort Care    DISPOSITION:  [x] ICU [] Stroke Unit [] Floor [] EMU [] RCU [] PCU    [x] Patient is at high risk of neurologic deterioration/death due to:     Time seen: 60  Time spent: 45 [x] critical care minutes

## 2024-05-21 NOTE — ED ADULT NURSE NOTE - PLAN OF CARE DISCUSSED WITH:
Abdomen soft, non-tender and non-distended, no rebound, no guarding and no masses. no hepatosplenomegaly.
Patient/Family

## 2024-05-21 NOTE — ED PROVIDER NOTE - PROGRESS NOTE DETAILS
I discussed the case with the stroke neurologist Clau Harris given the fact that the patient has nondisabling deficits of only mild nasolabial flattening and decreased sensation to the left arm.  He also has what appears to be improving symptoms, stating that his speech  deficit has resolved.  However the patient has a mismatch on the perfusion scan and a thrombus in the MCA.  Given these findings, stroke neurology advises that there is a potential for fluctuating symptoms, and the patient could potentially decompensate and might potentially be outside of the treatment window at that time.  Dr. Vance  Advises proceeding with tenecteplase administration.  Risk/benefit  discussion was had with the patient, who is agreeable with tenecteplase. Administered at 17:27

## 2024-05-21 NOTE — DISCUSSION/SUMMARY
[FreeTextEntry1] : Carlos is reporting episodes of dizziness with some hand numbness.  Even with these intermittent symptoms, his blood pressure is well-controlled, and he is in a sinus rhythm with ectopy.  There is some relation to head movements, though I would like him to see a neurologist for further testing.  He had a carotid Doppler, though the results are not yet available.  He is also reporting chest pain/dyspnea, and I would like to set him up for a coronary CTA to evaluate for obstructive CAD, and an echocardiogram to confirm the absence of structural heart disease.  I would prefer against exercising him on a treadmill given the dizziness.  His EKG does demonstrate a sinus rhythm, with a nonspecific ST abnormality anteriorly.  If the symptoms worsen, he needs to go to the emergency room.  He knows to call with any issues or concerns.  Will speak after the above testing, and arrange follow-up. [EKG obtained to assist in diagnosis and management of assessed problem(s)] : EKG obtained to assist in diagnosis and management of assessed problem(s)

## 2024-05-21 NOTE — PROGRESS NOTE ADULT - ASSESSMENT
59yoM presented with L sided weakness found to have R M1 stenosis.    Neuro/Vital checks per stroke protocol  CT 24hrs post TNK  MRI pending  Pain control- PRN  echo  TTE  start statin when taking PO

## 2024-05-21 NOTE — PROGRESS NOTE ADULT - SUBJECTIVE AND OBJECTIVE BOX
STROKE ATTENDING ON CALL    Received call from ED in regards to stroke code:      Patient is 58 y/o M with no pmh who presented with L sided weakness and paresthesias.  Initial , with sudden onset of LLE numbness, which resolved after few minutes. Then at 330 had slurred speech and L facial weakness and Left sided numbness.  Now improving, with NIHSS 2 for face and LLE numbness.  Neuroimaging reviewed:  CT head neg, CTA showing RM1 thrombus with distal flow intact, CTP showing large at risk territory.  Current BP is in 140s.      Given fluctuating symptoms, although improving,  have recommended TNK administration given presence of thrombus.  I have discussed case with Dr. Mosquera from Neuro IR and at this time as patient has a low NIHSS and likely to receive benefit from TNK, for now will hold off on emergent thrombectomy.  However should patient clinically worsen, he may be taken for thrombectomy.        PLAN:   TNK administration  -180  Neuro IR on standby should patient worsen clinically

## 2024-05-21 NOTE — ED PROVIDER NOTE - OBJECTIVE STATEMENT
59-year-old male with no signal past medical history presents with left-sided weakness.  Patient reports that he was driving approximate 2:30 PM when he had sudden onset of numbness to his left lower leg.  The symptoms lasted for several minutes and then resolved back to baseline.  He reports that at 3:30 PM while talking on the phone he was noted to be slurring his words.  He reports that he had weakness to left side of his face and his lunch was dribbling out of the left side of his mouth.  He reports associated numbness to his left hand.  He called his wife to bring him to the emergency room, reports that while in route his slurred speech began to  resolved and is currently back to baseline

## 2024-05-21 NOTE — ED ADULT NURSE NOTE - OBJECTIVE STATEMENT
Patient reports that he was driving approximate 2:30 PM when he had sudden onset of numbness to his left lower leg.  The symptoms lasted for several minutes and then resolved back to baseline.  He reports that at 3:30 PM while talking on the phone he was noted to be slurring his words.  He reports that he had weakness to left side of his face and his lunch was dribbling out of the left side of his mouth.  He reports associated numbness to his left hand.  He called his wife to bring him to the emergency room, reports that while in route his slurred speech began to  resolved and is currently back to baseline

## 2024-05-21 NOTE — PATIENT PROFILE ADULT - FUNCTIONAL ASSESSMENT - BASIC MOBILITY ASSESSMENT TYPE
39M w/ avulsion of R 4th digit tip. No fracture.  Analgesia  Soft bulky dressing  Elevate extremity  Keflex QID x 5 days  Keep dressing dry  FU with Dr. Frye in office Monday. Call office for appointment.  No acute surgical intervention  Orthopedic stable for discharge Admission

## 2024-05-21 NOTE — PROGRESS NOTE ADULT - SUBJECTIVE AND OBJECTIVE BOX
Chief complaint:   Patient is a 59y old  Male who presents with a chief complaint of Right M1 Stenosis (21 May 2024 18:00)    HPI:  HPI: 59-year-old male with no signal past medical history presents with left-sided weakness.  Patient reports that he was driving approximate 2:30 PM when he had sudden onset of numbness to his left lower leg.  The symptoms lasted for several minutes and then resolved back to baseline.  He reports that at 3:30 PM while talking on the phone he was noted to be slurring his words.  He reports that he had weakness to left side of his face and his lunch was dribbling out of the left side of his mouth.  He reports associated numbness to his left hand.  He called his wife to bring him to the emergency room, reports that while in route his slurred speech began to  resolved and is currently back to baseline. Patient received TNK @ 1723. CTA showing RM1 thrombus with distal flow intact, CTP showing large at risk territory          NIH Stroke Scale:   · NIH Stroke Scale: LOC	(0) Alert; keenly responsive  · NIH Stroke Scale: LOC Question	(0) Answers both questions correctly  · NIH Stroke Scale: LOC Command	(0) Performs both tasks correctly  · NIH Stroke Scale: Gaze	(0) Normal  · NIH Stroke Scale: Visual	(0) No visual loss  · NIH Stroke Scale: Facial	(1) Minor paralysis (flattened nasolabial fold, asymmetry on smiling)  · NIH Stroke Scale: Arm Left	(0) No drift; limb holds 90 (or 45) degrees for full 10 secs  · NIH Stroke Scale: Arm Right	(0) No drift; limb holds 90 (or 45) degrees for full 10 secs  · NIH Stroke Scale: Leg Left	(0) No drift; leg holds 30 degree position for full 5 secs  · NIH Stroke Scale: Leg Right	(0) No drift; leg holds 30 degree position for full 5 secs  · NIH Stroke Scale: Ataxia	(0) Absent  · NIH Stroke Scale: Sensory	(1) Mild-to-moderate sensory loss; patient feels pinprick is less sharp or is dull on the affected side; or there is a loss of superficial pain with pinprick, but patient is aware of being touched  · NIH Stroke Scale: Language	(0) No aphasia; normal  · NIH Stroke Scale: Dysarthria	(0) Normal  · NIH Stroke Scale: Extinct Inattention	(0) No abnormality  · NIH Stroke Scale: Total	2    GCS: 15  Pre-MRS: 0 (21 May 2024 17:52)        24hr EVENTS: L sided weakness resolved with TNK      ROS: no complaints  All other systems negative    -----------------------------------------------------------------------------------------------------------------------------------------------------------------------------------  ICU Vital Signs Last 24 Hrs  T(C): 36.6 (21 May 2024 23:00), Max: 36.8 (21 May 2024 18:27)  T(F): 97.9 (21 May 2024 23:00), Max: 98.3 (21 May 2024 18:27)  HR: 63 (21 May 2024 23:00) (61 - 77)  BP: 142/78 (21 May 2024 23:00) (118/74 - 155/94)  BP(mean): 97 (21 May 2024 23:00) (82 - 118)  ABP: --  ABP(mean): --  RR: 16 (21 May 2024 23:00) (10 - 24)  SpO2: 94% (21 May 2024 23:00) (94% - 100%)    O2 Parameters below as of 21 May 2024 20:00  Patient On (Oxygen Delivery Method): room air            I&O's Summary    21 May 2024 07:01  -  22 May 2024 00:33  --------------------------------------------------------  IN: 300 mL / OUT: 400 mL / NET: -100 mL        MEDICATIONS  (STANDING):  chlorhexidine 2% Cloths 1 Application(s) Topical daily  chlorhexidine 2% Cloths 1 Application(s) Topical daily  sodium chloride 0.9%. 1000 milliLiter(s) (60 mL/Hr) IV Continuous <Continuous>      RESPIRATORY:        IMAGING:   Recent imaging studies were reviewed.    LAB RESULTS:                          15.6   7.58  )-----------( 258      ( 21 May 2024 16:52 )             46.5       PT/INR - ( 21 May 2024 16:52 )   PT: 11.3 sec;   INR: 1.02 ratio         PTT - ( 21 May 2024 16:52 )  PTT:37.5 sec    05-21    139  |  103  |  14.8  ----------------------------<  96  4.4   |  24.0  |  1.01    Ca    9.5      21 May 2024 16:52    TPro  7.2  /  Alb  4.7  /  TBili  1.1  /  DBili  x   /  AST  33  /  ALT  39  /  AlkPhos  66  05-21                -----------------------------------------------------------------------------------------------------------------------------------------------------------------------------------    PHYSICAL EXAM:  General: Calm, intubated  HEENT: MMM  Neuro:  -Mental status- No acute distress  -CN- PERRL 3mm, EOMI, tongue midline, face symmetric    CV: RRR  Pulm: clear to auscultation  Abd: Soft, nontender, nondistended  Ext: no noted edema in lower ext  Skin: warm, dry

## 2024-05-21 NOTE — CONSULT NOTE ADULT - SUBJECTIVE AND OBJECTIVE BOX
Preliminary note, official note pending attending review/signature.  Seen and examined by Stroke team attending/team, assessment/ plan as discussed with stroke team attending/team as noted.                                [facility] Stroke Team      CC: Transient Left sided weakness and numbness     HPI:  59y male with no PMH presented to the [facility] ED on 05/21/24 for transient left sided weakness and numbness. Patient noticed the symptom earlier on the day of admission around 1430. The patient  was driving when he suddenly had an onset of left leg weakness which lasted a few minutes and then resolved. Later around 1530, he was speaking on the phone with a co worker when he started slurring his words. And prior to slurring episode he was noticed a transient left upper extremity numbness. He also noticed left facial droop. Upon arrival to the ED, NIHSS was noted to be 2 for  mild loss of nasolabial fold and mild left upper extremity numbness.   CT Head was unrevealing for any acute pathology, CTA H/N revealed a  focal thrombus in Right M1 with distal flow although reduced compared to the left side and CT perfusion revealed 6 ml penumbra in the right MCA region.    TNK was administered at 1727. The stroke Attending Dr. Vance and Neuro IR Dr. Mosquera discussed and decided that emergent mechanical thrombectomy intervention was not required at this time given the rapidly improving symptoms and low NIHSS. However should the patient clinically worsened, he may be taken to the IR suite for thrombectomy.     Of note, patient admitted to headache and fluctuating symptoms for the last 2 weeks. Patient stated that he had a recent visit with his cardiologist today who did an echo and recommended CT cardiac for calcium scoring.         Stroke risk factors:    PAST MEDICAL & SURGICAL HISTORY:      MEDICATIONS  (STANDING):  chlorhexidine 2% Cloths 1 Application(s) Topical daily  chlorhexidine 2% Cloths 1 Application(s) Topical daily  sodium chloride 0.9% lock flush 10 milliLiter(s) IV Push once  sodium chloride 0.9% lock flush 10 milliLiter(s) IV Push once  sodium chloride 0.9%. 1000 milliLiter(s) (60 mL/Hr) IV Continuous <Continuous>    MEDICATIONS  (PRN):      Allergies    No Known Allergies    Intolerances        SOCIAL HISTORY:  no tob,   no alcohol   no drugs    FAMILY HISTORY:        ROS: 14 point ROS negative other than what is present in HPI or below    Vital Signs Last 24 Hrs  T(C): 36.3 (21 May 2024 16:45), Max: 36.3 (21 May 2024 16:45)  T(F): 97.4 (21 May 2024 16:45), Max: 97.4 (21 May 2024 16:45)  HR: 72 (21 May 2024 17:55) (70 - 77)  BP: 133/84 (21 May 2024 17:55) (133/84 - 155/94)  BP(mean): --  RR: 18 (21 May 2024 17:55) (16 - 18)  SpO2: 98% (21 May 2024 17:55) (97% - 100%)    Parameters below as of 21 May 2024 17:55  Patient On (Oxygen Delivery Method): room air          Physical Exam:  General: No acute distress. wife at bedside     Detailed Neurologic Exam:    Mental status: The patient is awake and alert and has normal attention span.  The patient is fully oriented in 3 spheres. The patient is oriented to current events. The patient is able to name objects, follow commands, repeat sentences.    Cranial nerves: Pupils equal and react symmetrically to light. There is no visual field deficit to confrontation. Extraocular motion is full with no nystagmus. There is no ptosis. Facial sensation is intact. Facial musculature is symmetric. Palate elevates symmetrically. Tongue is midline.    Motor: There is normal bulk and tone.  There is no tremor.  Strength is 5/5 in the right arm and leg.   Strength is 5/5 in the left arm and leg.    Sensation: Intact to light touch     Reflexes: deferred    Cerebellar: There is no dysmetria on finger to nose testing.    Gait : deferred       [facility] SS:  DATE: 05/21/24  TIME: 1753   1A: Level of consciousness (0-3):   1B: Questions (0-2):   1C: Commands (0-2):   2: Gaze (0-2):   3: Visual fields (0-3):   4: Facial palsy (0-3):   MOTOR:  5A: Left arm motor drift (0-4):   5B: Right arm motor drift (0-4):   6A: Left leg motor drift (0-4):   6B: Right leg motor drift (0-4):   7: Limb ataxia (0-2):   SENSORY:  8: Sensation (0-2):   SPEECH:  9: Language (0-3):   10: Dysarthria (0-2):   EXTINCTION:  11: Extinction/inattention (0-2):     TOTAL SCORE: 0    prehospital mRS= 0      LABS:                         15.6   7.58  )-----------( 258      ( 21 May 2024 16:52 )             46.5       05-21    139  |  103  |  14.8  ----------------------------<  96  4.4   |  24.0  |  1.01    Ca    9.5      21 May 2024 16:52    TPro  7.2  /  Alb  4.7  /  TBili  1.1  /  DBili  x   /  AST  33  /  ALT  39  /  AlkPhos  66  05-21      PT/INR - ( 21 May 2024 16:52 )   PT: 11.3 sec;   INR: 1.02 ratio         PTT - ( 21 May 2024 16:52 )  PTT:37.5 sec        A1C:       RADIOLOGY & ADDITIONAL STUDIES (independently reviewed unless otherwise noted):    (05.21.24 @ 17:13)   IMPRESSION:    CT PERFUSION:  Technical limitations: None.    Core infarction: 0 ml  Penumbra / tissue at risk for active ischemia: 6 mL right MCA territory    CTA NECK:  No evidence of significant stenosis or occlusion.    CTA HEAD:  Focal thrombus suspected in the M1 segment of the right MCA without   complete occlusion. There is distal flow although this is slightly   reduced compared to the left side.  Moderate to severe focal stenosis in the proximal [id number] of the left   MCA.  Posterior circulation normal variants.

## 2024-05-21 NOTE — ED PROVIDER NOTE - NSSTROKETPADOOR_OTHER_FT
administration of tnk delayed due to rapidly fluctuating Sx delaying the determination for eligibility of thrombolytics

## 2024-05-21 NOTE — ED ADULT NURSE NOTE - NSFALLUNIVINTERV_ED_ALL_ED
Bed/Stretcher in lowest position, wheels locked, appropriate side rails in place/Call bell, personal items and telephone in reach/Instruct patient to call for assistance before getting out of bed/chair/stretcher/Non-slip footwear applied when patient is off stretcher/[place] to call system/Physically safe environment - no spills, clutter or unnecessary equipment/Purposeful proactive rounding/Room/bathroom lighting operational, light cord in reach

## 2024-05-22 ENCOUNTER — TRANSCRIPTION ENCOUNTER (OUTPATIENT)
Age: 59
End: 2024-05-22

## 2024-05-22 LAB
ANION GAP SERPL CALC-SCNC: 12 MMOL/L — SIGNIFICANT CHANGE UP (ref 5–17)
APTT BLD: 36.6 SEC — HIGH (ref 24.5–35.6)
BLD GP AB SCN SERPL QL: SIGNIFICANT CHANGE UP
BUN SERPL-MCNC: 14.1 MG/DL — SIGNIFICANT CHANGE UP (ref 8–20)
CALCIUM SERPL-MCNC: 8.9 MG/DL — SIGNIFICANT CHANGE UP (ref 8.4–10.5)
CHLORIDE SERPL-SCNC: 106 MMOL/L — SIGNIFICANT CHANGE UP (ref 96–108)
CO2 SERPL-SCNC: 21 MMOL/L — LOW (ref 22–29)
CREAT SERPL-MCNC: 0.84 MG/DL — SIGNIFICANT CHANGE UP (ref 0.5–1.3)
EGFR: 99 ML/MIN/1.73M2 — SIGNIFICANT CHANGE UP
GLUCOSE SERPL-MCNC: 88 MG/DL — SIGNIFICANT CHANGE UP (ref 70–99)
HCT VFR BLD CALC: 41.8 % — SIGNIFICANT CHANGE UP (ref 39–50)
HGB BLD-MCNC: 14.2 G/DL — SIGNIFICANT CHANGE UP (ref 13–17)
INR BLD: 1.05 RATIO — SIGNIFICANT CHANGE UP (ref 0.85–1.18)
MAGNESIUM SERPL-MCNC: 2.2 MG/DL — SIGNIFICANT CHANGE UP (ref 1.6–2.6)
MCHC RBC-ENTMCNC: 29.6 PG — SIGNIFICANT CHANGE UP (ref 27–34)
MCHC RBC-ENTMCNC: 34 GM/DL — SIGNIFICANT CHANGE UP (ref 32–36)
MCV RBC AUTO: 87.3 FL — SIGNIFICANT CHANGE UP (ref 80–100)
MRSA PCR RESULT.: SIGNIFICANT CHANGE UP
PHOSPHATE SERPL-MCNC: 3 MG/DL — SIGNIFICANT CHANGE UP (ref 2.4–4.7)
PLATELET # BLD AUTO: 215 K/UL — SIGNIFICANT CHANGE UP (ref 150–400)
POTASSIUM SERPL-MCNC: 3.5 MMOL/L — SIGNIFICANT CHANGE UP (ref 3.5–5.3)
POTASSIUM SERPL-SCNC: 3.5 MMOL/L — SIGNIFICANT CHANGE UP (ref 3.5–5.3)
PROTHROM AB SERPL-ACNC: 11.6 SEC — SIGNIFICANT CHANGE UP (ref 9.5–13)
RBC # BLD: 4.79 M/UL — SIGNIFICANT CHANGE UP (ref 4.2–5.8)
RBC # FLD: 13 % — SIGNIFICANT CHANGE UP (ref 10.3–14.5)
S AUREUS DNA NOSE QL NAA+PROBE: DETECTED
SODIUM SERPL-SCNC: 139 MMOL/L — SIGNIFICANT CHANGE UP (ref 135–145)
T4 FREE SERPL-MCNC: 1.2 NG/DL — SIGNIFICANT CHANGE UP (ref 0.9–1.8)
WBC # BLD: 7.44 K/UL — SIGNIFICANT CHANGE UP (ref 3.8–10.5)
WBC # FLD AUTO: 7.44 K/UL — SIGNIFICANT CHANGE UP (ref 3.8–10.5)

## 2024-05-22 PROCEDURE — 70450 CT HEAD/BRAIN W/O DYE: CPT | Mod: 26

## 2024-05-22 PROCEDURE — 99232 SBSQ HOSP IP/OBS MODERATE 35: CPT

## 2024-05-22 PROCEDURE — 99233 SBSQ HOSP IP/OBS HIGH 50: CPT

## 2024-05-22 RX ORDER — HYDRALAZINE HCL 50 MG
10 TABLET ORAL
Refills: 0 | Status: DISCONTINUED | OUTPATIENT
Start: 2024-05-22 | End: 2024-05-23

## 2024-05-22 RX ORDER — ASPIRIN/CALCIUM CARB/MAGNESIUM 324 MG
81 TABLET ORAL DAILY
Refills: 0 | Status: DISCONTINUED | OUTPATIENT
Start: 2024-05-22 | End: 2024-05-23

## 2024-05-22 RX ORDER — ACETAMINOPHEN 500 MG
650 TABLET ORAL EVERY 6 HOURS
Refills: 0 | Status: DISCONTINUED | OUTPATIENT
Start: 2024-05-22 | End: 2024-05-23

## 2024-05-22 RX ORDER — ATORVASTATIN CALCIUM 80 MG/1
80 TABLET, FILM COATED ORAL AT BEDTIME
Refills: 0 | Status: DISCONTINUED | OUTPATIENT
Start: 2024-05-22 | End: 2024-05-23

## 2024-05-22 RX ORDER — POTASSIUM CHLORIDE 20 MEQ
40 PACKET (EA) ORAL ONCE
Refills: 0 | Status: COMPLETED | OUTPATIENT
Start: 2024-05-22 | End: 2024-05-22

## 2024-05-22 RX ORDER — POLYETHYLENE GLYCOL 3350 17 G/17G
17 POWDER, FOR SOLUTION ORAL DAILY
Refills: 0 | Status: DISCONTINUED | OUTPATIENT
Start: 2024-05-22 | End: 2024-05-23

## 2024-05-22 RX ORDER — CLOPIDOGREL BISULFATE 75 MG/1
75 TABLET, FILM COATED ORAL DAILY
Refills: 0 | Status: DISCONTINUED | OUTPATIENT
Start: 2024-05-22 | End: 2024-05-23

## 2024-05-22 RX ORDER — SODIUM CHLORIDE 9 MG/ML
500 INJECTION INTRAMUSCULAR; INTRAVENOUS; SUBCUTANEOUS ONCE
Refills: 0 | Status: COMPLETED | OUTPATIENT
Start: 2024-05-22 | End: 2024-05-22

## 2024-05-22 RX ORDER — SENNA PLUS 8.6 MG/1
2 TABLET ORAL AT BEDTIME
Refills: 0 | Status: DISCONTINUED | OUTPATIENT
Start: 2024-05-22 | End: 2024-05-23

## 2024-05-22 RX ORDER — MUPIROCIN 20 MG/G
1 OINTMENT TOPICAL EVERY 12 HOURS
Refills: 0 | Status: DISCONTINUED | OUTPATIENT
Start: 2024-05-22 | End: 2024-05-23

## 2024-05-22 RX ADMIN — CHLORHEXIDINE GLUCONATE 1 APPLICATION(S): 213 SOLUTION TOPICAL at 11:46

## 2024-05-22 RX ADMIN — Medication 40 MILLIEQUIVALENT(S): at 17:42

## 2024-05-22 RX ADMIN — ATORVASTATIN CALCIUM 80 MILLIGRAM(S): 80 TABLET, FILM COATED ORAL at 21:49

## 2024-05-22 RX ADMIN — SODIUM CHLORIDE 500 MILLILITER(S): 9 INJECTION INTRAMUSCULAR; INTRAVENOUS; SUBCUTANEOUS at 15:35

## 2024-05-22 RX ADMIN — MUPIROCIN 1 APPLICATION(S): 20 OINTMENT TOPICAL at 17:42

## 2024-05-22 NOTE — PHYSICAL THERAPY INITIAL EVALUATION ADULT - GENERAL OBSERVATIONS, REHAB EVAL
Pt received in chair + IV + tele//BP monitoring, breathing on RA in NAD, in 0/10 pain, agreeable to PT evaluation

## 2024-05-22 NOTE — PHYSICAL THERAPY INITIAL EVALUATION ADULT - PERTINENT HX OF CURRENT PROBLEM, REHAB EVAL
59y male with no PMH presented to the [facility] ED on 05/21/24 for transient left sided weakness, numbness and facial droop. Upon arrival to the ED, NIHSS was noted to be 2 for mild loss of nasolabial fold on the left and mild left upper extremity numbness.  CT Head was unrevealing for any acute pathology, CTA H/N revealed a  focal thrombus in Right M1 with distal flow although reduced compared to the left side and CT perfusion revealed 6 ml penumbra in the right MCA region. TNK was administered at 1727. The stroke Attending Dr. Vance and Neuro IR Dr. Mosquera discussed and decided that emergent mechanical thrombectomy intervention was not required at this time given the rapidly improving symptoms and low NIHSS. However should the patient clinically worsened, he may be taken to the IR suite for thrombectomy.

## 2024-05-22 NOTE — DISCHARGE NOTE NURSING/CASE MANAGEMENT/SOCIAL WORK - PATIENT PORTAL LINK FT
You can access the FollowMyHealth Patient Portal offered by [facility] by registering at the following website: [email or web address]. By joining [employer]’s FollowMyHealth portal, you will also be able to view your health information using other applications (apps) compatible with our system.

## 2024-05-22 NOTE — PROGRESS NOTE ADULT - ASSESSMENT
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   Critical care time spent examining patient, reviewing vitals, labs, medications, imaging and discussing with the team goals of care   The combined critical care time provided to the patient was 60 minutes  This time does not include bedside procedures that are documented separately.

## 2024-05-22 NOTE — PROGRESS NOTE ADULT - SUBJECTIVE AND OBJECTIVE BOX
59M previously healthy presents with AIS with left-sided weakness from R MCA stenosis sp TNK @ 1723 No ET given lack of LVO and low NIHSS with rapidly resolving symptoms    24hr EVENTS:   5/21 - L sided weakness resolved with TNK      PHYSICAL EXAM:  General: Calm  HEENT: MMM  Neuro:  -Mental status- No acute distress  -CN- PERRL 3mm, EOMI, tongue midline, face symmetric  - Strength symmetric    CV: RRR  Pulm: clear to auscultation  Abd: Soft, nontender, nondistended  Ext: no noted edema in lower ext  Skin: warm, dry

## 2024-05-22 NOTE — CHART NOTE - NSCHARTNOTEFT_GEN_A_CORE
NSICU DOWN GRADE NOTE    Transfer from: NSICU    Transfer to: ( ) Medicine    (  ) Telemetry        Accepting Physician:     Signout given to:     HPI     HPI: 59-year-old male with no signal past medical history presents with left-sided weakness.  Patient reports that he was driving approximate 2:30 PM when he had sudden onset of numbness to his left lower leg.  The symptoms lasted for several minutes and then resolved back to baseline.  He reports that at 3:30 PM while talking on the phone he was noted to be slurring his words.  He reports that he had weakness to left side of his face and his lunch was dribbling out of the left side of his mouth.  He reports associated numbness to his left hand.  He called his wife to bring him to the emergency room, reports that while in route his slurred speech began to  resolved and is currently back to baseline. Patient received TNK @ 1723. CTA showing RM1 thrombus with distal flow intact, CTP showing large at risk territory. No ET given lack of LVO and low NIHSS with rapidly resolving symptoms.       INTERVAL HPI/OVERNIGHT EVENTS: no acute events reported overnight         REVIEW OF SYSTEMS:  CONSTITUTIONAL: No fever, chills  HEENT:  No blurry vision No sinus or throat pain  NECK: No pain or stiffness  RESPIRATORY: No cough, wheezing, chills or hemoptysis; No shortness of breath  CARDIOVASCULAR: No chest pain, palpitations  GASTROINTESTINAL: No abdominal pain. No nausea, vomiting, or diarrhea  GENITOURINARY: No dysuria  NEUROLOGICAL: No HA, No focal weakness  SKIN: No itching, burning, rashes, or lesions   MUSCULOSKELETAL: No joint pain or swelling; No muscle, back, or extremity pain    MEDICATIONS:  acetaminophen     Tablet .. 650 milliGRAM(s) Oral every 6 hours PRN  atorvastatin 80 milliGRAM(s) Oral at bedtime  chlorhexidine 2% Cloths 1 Application(s) Topical daily  chlorhexidine 2% Cloths 1 Application(s) Topical daily  mupirocin 2% Nasal 1 Application(s) Both Nostrils every 12 hours  polyethylene glycol 3350 17 Gram(s) Oral daily  potassium chloride    Tablet ER 40 milliEquivalent(s) Oral once  senna 2 Tablet(s) Oral at bedtime      T(C): 36.6 (05-22-24 @ 11:08), Max: 37 (05-22-24 @ 07:43)  HR: 87 (05-22-24 @ 12:00) (55 - 87)  BP: 120/91 (05-22-24 @ 12:00) (118/74 - 155/94)  RR: 24 (05-22-24 @ 12:00) (10 - 24)  SpO2: 97% (05-22-24 @ 12:00) (89% - 100%)  Wt(kg): --Vital Signs Last 24 Hrs  T(C): 36.6 (22 May 2024 11:08), Max: 37 (22 May 2024 07:43)  T(F): 97.8 (22 May 2024 11:08), Max: 98.6 (22 May 2024 07:43)  HR: 87 (22 May 2024 12:00) (55 - 87)  BP: 120/91 (22 May 2024 12:00) (118/74 - 155/94)  BP(mean): 96 (22 May 2024 12:00) (77 - 118)  RR: 24 (22 May 2024 12:00) (10 - 24)  SpO2: 97% (22 May 2024 12:00) (89% - 100%)    Parameters below as of 22 May 2024 12:00  Patient On (Oxygen Delivery Method): room air        PHYSICAL EXAM:  GENERAL: NAD, well-groomed, well-developed  HEAD:  Atraumatic, Normocephalic  EYES: EOMI, PERRLA, conjunctiva and sclera clear  ENMT:  Moist mucous membranes  NECK: Supple, No JVD,  CHEST/LUNG: Clear to auscultation bilaterally; No rales, rhonchi, wheezing, or rubs  HEART: Regular rate and rhythm; No murmurs, rubs, or gallops  ABDOMEN: Soft, Nontender, Nondistended; Bowel sounds present  NEURO/CN: Alert & Oriented X3, tongue midline, face symmetric  EXTREMITIES: No LE edema, no calf tenderness, 5/5 DUCKWORTH        I & Os    05-21-24 @ 07:01  -  05-22-24 @ 07:00  --------------------------------------------------------  IN: 720 mL / OUT: 700 mL / NET: 20 mL    05-22-24 @ 07:01  -  05-22-24 @ 12:57  --------------------------------------------------------  IN: 120 mL / OUT: 300 mL / NET: -180 mL      LABS:                        14.2   7.44  )-----------( 215      ( 22 May 2024 03:19 )             41.8     05-22    139  |  106  |  14.1  ----------------------------<  88  3.5   |  21.0<L>  |  0.84    Ca    8.9      22 May 2024 03:19  Phos  3.0     05-22  Mg     2.2     05-22    TPro  7.2  /  Alb  4.7  /  TBili  1.1  /  DBili  x   /  AST  33  /  ALT  39  /  AlkPhos  66  05-21    PT/INR - ( 22 May 2024 03:19 )   PT: 11.6 sec;   INR: 1.05 ratio         PTT - ( 22 May 2024 03:19 )  PTT:36.6 sec  Urinalysis Basic - ( 22 May 2024 03:19 )    Color: x / Appearance: x / SG: x / pH: x  Gluc: 88 mg/dL / Ketone: x  / Bili: x / Urobili: x   Blood: x / Protein: x / Nitrite: x   Leuk Esterase: x / RBC: x / WBC x   Sq Epi: x / Non Sq Epi: x / Bacteria: x      CAPILLARY BLOOD GLUCOSE      POCT Blood Glucose.: 98 mg/dL (21 May 2024 16:48)        Urinalysis Basic - ( 22 May 2024 03:19 )    Color: x / Appearance: x / SG: x / pH: x  Gluc: 88 mg/dL / Ketone: x  / Bili: x / Urobili: x   Blood: x / Protein: x / Nitrite: x   Leuk Esterase: x / RBC: x / WBC x   Sq Epi: x / Non Sq Epi: x / Bacteria: x        RADIOLOGY & ADDITIONAL TESTS:  5/21 @17:17  CT PERFUSION:  Technical limitations: None.    Core infarction: 0 ml  Penumbra / tissue at risk for active ischemia: 6 mL right MCA territory    CTA NECK:  No evidence of significant stenosis or occlusion.    CTA HEAD:  Focal thrombus suspected in the M1 segment of the right MCA without   complete occlusion. There is distal flow although this is slightly   reduced compared to the left side.  Moderate to severe focal stenosis in the proximal [id number] of the left   MCA.  Posterior circulation normal variants.  No evidence of aneurysm. Tiny aneurysms can be beyond the resolution of   CTA technique.    ASSESSMENT: 58 yo male with no pmh presented with L sided weakness. CTA showed RM1 thrombus with distal flow intact with CTP showing large at risk territory. s/p TNK  on 5/21 @ 17:27 with resolving symptoms.     PLAN    Neuro:  -neurochecks   -rCTH 24hrs post TNK (around 1730 5/22)  -MR Head w/o contrast   -PRN tylenol for mild pain   -PT/OT     CV:  -sbp goal 100-180  - continue Lipitor 80mg qhs     Resp:  - on room air   - goal spO2 >94%    GI  -LBM 5/22  - continue senna/miralax qd  -Regular diet       - voiding     Heme  -DVT ppx: SCDs and subq Lovenox s/p 24h CTH  - DAPT s/p 24h CTH    ID:  -continue nasal bactroban for +MRSA until 5/27    Endo  - Glucose goal of <180 (A1c 6.3) NSICU DOWN GRADE NOTE    Transfer from: NSICU    Transfer to: ( ) Medicine ( x ) Stroke   ( x ) Telemetry        Accepting Physician:     Signout given to:     HPI     HPI: 59-year-old male with no signal past medical history presents with left-sided weakness.  Patient reports that he was driving approximate 2:30 PM when he had sudden onset of numbness to his left lower leg.  The symptoms lasted for several minutes and then resolved back to baseline.  He reports that at 3:30 PM while talking on the phone he was noted to be slurring his words.  He reports that he had weakness to left side of his face and his lunch was dribbling out of the left side of his mouth.  He reports associated numbness to his left hand.  He called his wife to bring him to the emergency room, reports that while in route his slurred speech began to  resolved and is currently back to baseline. Patient received TNK @ 1723. CTA showing RM1 thrombus with distal flow intact, CTP showing large at risk territory. No ET given lack of LVO and low NIHSS with rapidly resolving symptoms.       INTERVAL HPI/OVERNIGHT EVENTS: no acute events reported overnight         REVIEW OF SYSTEMS:  CONSTITUTIONAL: No fever, chills  HEENT:  No blurry vision No sinus or throat pain  NECK: No pain or stiffness  RESPIRATORY: No cough, wheezing, chills or hemoptysis; No shortness of breath  CARDIOVASCULAR: No chest pain, palpitations  GASTROINTESTINAL: No abdominal pain. No nausea, vomiting, or diarrhea  GENITOURINARY: No dysuria  NEUROLOGICAL: No HA, No focal weakness  SKIN: No itching, burning, rashes, or lesions   MUSCULOSKELETAL: No joint pain or swelling; No muscle, back, or extremity pain    MEDICATIONS:  acetaminophen     Tablet .. 650 milliGRAM(s) Oral every 6 hours PRN  atorvastatin 80 milliGRAM(s) Oral at bedtime  chlorhexidine 2% Cloths 1 Application(s) Topical daily  chlorhexidine 2% Cloths 1 Application(s) Topical daily  mupirocin 2% Nasal 1 Application(s) Both Nostrils every 12 hours  polyethylene glycol 3350 17 Gram(s) Oral daily  potassium chloride    Tablet ER 40 milliEquivalent(s) Oral once  senna 2 Tablet(s) Oral at bedtime    ICU Vital Signs Last 24 Hrs  T(C): 36.8 (22 May 2024 23:00), Max: 37 (22 May 2024 07:43)  T(F): 98.3 (22 May 2024 23:00), Max: 98.6 (22 May 2024 07:43)  HR: 59 (23 May 2024 06:00) (57 - 87)  BP: 111/61 (23 May 2024 06:00) (104/85 - 148/88)  BP(mean): 77 (23 May 2024 06:00) (71 - 107)  ABP: --  ABP(mean): --  RR: 16 (23 May 2024 06:00) (12 - 24)  SpO2: 95% (23 May 2024 06:00) (92% - 100%)    O2 Parameters below as of 23 May 2024 06:00  Patient On (Oxygen Delivery Method): room air      PHYSICAL EXAM:  GENERAL: NAD, well-groomed, well-developed  HEAD:  Atraumatic, Normocephalic  EYES: EOMI, PERRLA, conjunctiva and sclera clear  ENMT:  Moist mucous membranes  NECK: Supple, No JVD,  CHEST/LUNG: Clear to auscultation bilaterally; No rales, rhonchi, wheezing, or rubs  HEART: Regular rate and rhythm; No murmurs, rubs, or gallops  ABDOMEN: Soft, Nontender, Nondistended; Bowel sounds present  NEURO/CN: Alert & Oriented X3, tongue midline, face symmetric  EXTREMITIES: No LE edema, no calf tenderness, 5/5 DUCKOWRTH        I&O's Detail    22 May 2024 07:01  -  23 May 2024 06:38  --------------------------------------------------------  IN:    Oral Fluid: 940 mL    sodium chloride 0.9%: 120 mL    Sodium Chloride 0.9% Bolus: 500 mL  Total IN: 1560 mL    OUT:    Voided (mL): 300 mL  Total OUT: 300 mL    Total NET: 1260 mL        LABS:                                   14.0   7.40  )-----------( 203      ( 23 May 2024 03:49 )             41.8   05-23    141  |  105  |  16.2  ----------------------------<  121<H>  4.1   |  24.0  |  0.92    Ca    8.9      23 May 2024 03:49  Phos  3.5     05-23  Mg     2.1     05-23    TPro  7.2  /  Alb  4.7  /  TBili  1.1  /  DBili  x   /  AST  33  /  ALT  39  /  AlkPhos  66  05-21      Urinalysis Basic - ( 22 May 2024 03:19 )    Color: x / Appearance: x / SG: x / pH: x  Gluc: 88 mg/dL / Ketone: x  / Bili: x / Urobili: x   Blood: x / Protein: x / Nitrite: x   Leuk Esterase: x / RBC: x / WBC x   Sq Epi: x / Non Sq Epi: x / Bacteria: x      RADIOLOGY & ADDITIONAL TESTS:  5/21 @17:17  CT PERFUSION:  Technical limitations: None.    Core infarction: 0 ml  Penumbra / tissue at risk for active ischemia: 6 mL right MCA territory    CTA NECK:  No evidence of significant stenosis or occlusion.    CTA HEAD:  Focal thrombus suspected in the M1 segment of the right MCA without   complete occlusion. There is distal flow although this is slightly   reduced compared to the left side.  Moderate to severe focal stenosis in the proximal [id number] of the left   MCA.  Posterior circulation normal variants.  No evidence of aneurysm. Tiny aneurysms can be beyond the resolution of   CTA technique.    ASSESSMENT: 60 yo male with no pmh presented with L sided weakness. CTA showed RM1 thrombus with distal flow intact with CTP showing large at risk territory. s/p TNK  on 5/21 @ 17:27 with resolving symptoms.     PLAN    Neuro:  -neurochecks per primary team  -rCTH 24hrs post TNK (around 1730 5/22)  -MR Head w/o contrast   -PRN tylenol for mild pain   -PT/OT     CV:  -sbp goal 100-180  - continue Lipitor 80mg qhs     Resp:  - on room air   - goal spO2 >94%    GI  -LBM 5/22  - continue senna/miralax qd  -Regular diet       - voiding     Heme  -DVT ppx: SCDs and subq Lovenox s/p 24h CTH  - DAPT s/p 24h CTH    ID:  -continue nasal bactroban for +MRSA until 5/27    Endo  - Glucose goal of <180 (A1c 6.3)

## 2024-05-22 NOTE — PROGRESS NOTE ADULT - ASSESSMENT
ASSESSMENT:   59y male with no PMH presented to the [facility] ED on 05/21/24 for transient left sided weakness, numbness and facial droop. Upon arrival to the ED, NIHSS was noted to be 2 for mild loss of nasolabial fold on the left and mild left upper extremity numbness.  CT Head was unrevealing for any acute pathology, CTA H/N revealed a  focal thrombus in Right M1 with distal flow although reduced compared to the left side and CT perfusion revealed 6 ml penumbra in the right MCA region. TNK was administered at 1727. The stroke Attending Dr. Vance and Neuro IR Dr. Mosquera discussed and decided that emergent mechanical thrombectomy intervention was not required at this time given the rapidly improving symptoms and low NIHSS. However should the patient clinically worsened, he may be taken to the IR suite for thrombectomy.     IMPRESSION: Etiology at this time concerning for embolic stroke of unknown origin- although cardioembolic and hypercoagulable state can not be excluded at this time. Further recommendations pending workup. Follows outpatient with Dr. Moore cardiologist and Dr. Martinez neurologist     NEURO:   -Neurologically back to baseline   -Continue close monitoring for neurologic deterioration    - Stroke neuro checks q 1hr   - Permissive HTN up to 180 - per post thrombolytic administration protocol    -ANTITHROMBOTIC THERAPY: hold given thrombolytic administration - anticipate a 90 day course of DAPT- aspirin 81 mg and Plavix 75 mg daily; afterwards Aspirin monotherapy  -titrate statin to LDL goal less than 70  -Rpt CTH 24hr - per post thrombolytic administration protocol   -MRI Brain w/o pending   -Dysphagia screen: pass  -Physical therapy/OT/Speech eval/treatment.   -TTE noted, EF 50 -55%, left atrium normal  -Cardiac monitoring w/ telemetry for now, further evaluation pending findings of noted workup          -patient should have all age and risk appropriate malignancy screenings with PCP or sooner if clinically suspected   -DVT ppx:  SCD for now    -maintain adequate hydration    -Na Goal: 135-145   -monitor for si/sx of infection   -LE dopplers unrevealing   -Stroke education done 05/22    OTHER:  condition and plan of care d/w patient, questions and concerns addressed.     DISPOSITION: Rehab or home depending on PT eval once stable and workup is complete      CORE MEASURES:        Admission NIHSS: 2     Tenecteplase : [x] YES [] NO      LDL/A1C: 115/6.3     Depression Screen- if depression hx and/or present      Statin Therapy: on Lipitor 80 mg      Dysphagia Screen: [x] PASS      Smoking [] YES [] NO      Afib [] YES [x] NO     Stroke Education [x] YES [] NO   ASSESSMENT:   59y male with no PMH presented to the [facility] ED on 05/21/24 for transient left sided weakness, numbness and facial droop. Upon arrival to the ED, NIHSS was noted to be 2 for mild loss of nasolabial fold on the left and mild left upper extremity numbness.  CT Head was unrevealing for any acute pathology, CTA H/N revealed a  focal thrombus in Right M1 with distal flow although reduced compared to the left side and CT perfusion revealed 6 ml penumbra in the right MCA region. TNK was administered at 1727. The stroke Attending Dr. Vance and Neuro IR Dr. Mosquera discussed and decided that emergent mechanical thrombectomy intervention was not required at this time given the rapidly improving symptoms and low NIHSS. However should the patient clinically worsened, he may be taken to the IR suite for thrombectomy.     IMPRESSION: Etiology likely from symptomatic ICAD given severe RM1 occlusion/stenosis and contralateral LM1 mod-severe atherosclerosis.   Follows outpatient with Dr. Moore cardiologist and Dr. Martinez neurologist     NEURO:   -Neurologically back to baseline, NIHSS 0   -Continue close monitoring for neurologic deterioration    - Stroke neuro checks q 1hr   - Permissive HTN up to 180 - per post thrombolytic administration protocol    -ANTITHROMBOTIC THERAPY: hold given thrombolytic administration - anticipate a 90 day course of DAPT- aspirin 81 mg and Plavix 75 mg daily if repeat CT stable; afterwards Aspirin monotherapy  -titrate statin to LDL goal less than 70  -Rpt CTH 24hr - per post thrombolytic administration protocol   -MRI Brain w/o pending  -Dysphagia screen: pass  -Physical therapy/OT/Speech eval/treatment.   -TTE noted, EF 50 -55%, left atrium normal  -Cardiac monitoring w/ telemetry for now, further evaluation pending findings of noted workup          -patient should have all age and risk appropriate malignancy screenings with PCP or sooner if clinically suspected   -DVT ppx:  SCD for now    -maintain adequate hydration    -Na Goal: 135-145   -monitor for si/sx of infection   -LE dopplers unrevealing   -Stroke education done 05/22  - f/u with Dr. Mosquera given intracranial athero--will need to be monitored closely    OTHER:  condition and plan of care d/w patient, questions and concerns addressed.     DISPOSITION: Rehab or home depending on PT eval once stable and workup is complete      CORE MEASURES:        Admission NIHSS: 2     Tenecteplase : [x] YES [] NO      LDL/A1C: 115/6.3     Depression Screen- if depression hx and/or present      Statin Therapy: on Lipitor 80 mg      Dysphagia Screen: [x] PASS      Smoking [] YES [] NO      Afib [] YES [x] NO     Stroke Education [x] YES [] NO

## 2024-05-22 NOTE — OCCUPATIONAL THERAPY INITIAL EVALUATION ADULT - PERTINENT HX OF CURRENT PROBLEM, REHAB EVAL
As per MD note: 59y male with no PMH presented to the [facility] ED on 05/21/24 for transient left sided weakness and numbness. Patient noticed the symptom earlier on the day of admission around 1430. The patient  was driving when he suddenly had an onset of left leg weakness which lasted a few minutes and then resolved. Later around 1530, he was speaking on the phone with a co worker when he started slurring his words. And prior to slurring episode he was noticed a transient left upper extremity numbness. He also noticed left facial droop. Upon arrival to the ED, NIHSS was noted to be 2 for  mild loss of nasolabial fold and mild left upper extremity numbness.   CT Head was unrevealing for any acute pathology, CTA H/N revealed a  focal thrombus in Right M1 with distal flow although reduced compared to the left side and CT perfusion revealed 6 ml penumbra in the right MCA region.

## 2024-05-22 NOTE — PHYSICAL THERAPY INITIAL EVALUATION ADULT - ADDITIONAL COMMENTS
pt reports living in private home with wife who is able to assist. Pt has 1 FRANCINE and all needs met on the first floor. Independent prior to admission. Owns no DME.

## 2024-05-22 NOTE — PROGRESS NOTE ADULT - SUBJECTIVE AND OBJECTIVE BOX
Preliminary note, official recommendations pending attending review/signature   Seen and examined by Stroke team attending/team, assessment/ plan as discussed with stroke team attending/team as noted.     [facility] Stroke Team  Progress Note     HPI:  59y male with no PMH presented to the [facility] ED on 05/21/24 for transient left sided weakness and numbness. Patient noticed the symptom earlier on the day of admission around 1430. The patient  was driving when he suddenly had an onset of left leg weakness which lasted a few minutes and then resolved. Later around 1530, he was speaking on the phone with a co worker when he started slurring his words. And prior to slurring episode he was noticed a transient left upper extremity numbness. He also noticed left facial droop. Upon arrival to the ED, NIHSS was noted to be 2 for  mild loss of nasolabial fold and mild left upper extremity numbness.   CT Head was unrevealing for any acute pathology, CTA H/N revealed a  focal thrombus in Right M1 with distal flow although reduced compared to the left side and CT perfusion revealed 6 ml penumbra in the right MCA region.    TNK was administered at 1727. The stroke Attending Dr. Vance and Neuro IR Dr. Mosquera discussed and decided that emergent mechanical thrombectomy intervention was not required at this time given the rapidly improving symptoms and low NIHSS. However should the patient clinically worsened, he may be taken to the IR suite for thrombectomy.     Of note, patient admitted to headache and fluctuating symptoms for the last 2 weeks. Patient stated that he had a recent visit with his cardiologist today who did an echo and recommended CT cardiac for calcium scoring.       SUBJECTIVE: No events overnight.  No new neurologic complaints.  ROS reported negative unless otherwise noted.      acetaminophen     Tablet .. 650 milliGRAM(s) Oral every 6 hours PRN  atorvastatin 80 milliGRAM(s) Oral at bedtime  chlorhexidine 2% Cloths 1 Application(s) Topical daily  chlorhexidine 2% Cloths 1 Application(s) Topical daily  mupirocin 2% Nasal 1 Application(s) Both Nostrils every 12 hours  polyethylene glycol 3350 17 Gram(s) Oral daily  potassium chloride    Tablet ER 40 milliEquivalent(s) Oral once  senna 2 Tablet(s) Oral at bedtime  sodium chloride 0.9% Bolus 500 milliLiter(s) IV Bolus once      PHYSICAL EXAM:   Vital Signs Last 24 Hrs  T(C): 36.6 (22 May 2024 11:08), Max: 37 (22 May 2024 07:43)  T(F): 97.8 (22 May 2024 11:08), Max: 98.6 (22 May 2024 07:43)  HR: 69 (22 May 2024 13:00) (55 - 87)  BP: 110/54 (22 May 2024 13:00) (110/54 - 155/94)  BP(mean): 71 (22 May 2024 13:00) (71 - 118)  RR: 21 (22 May 2024 13:00) (10 - 24)  SpO2: 98% (22 May 2024 13:00) (89% - 100%)    Parameters below as of 22 May 2024 12:00  Patient On (Oxygen Delivery Method): room air        General: No acute distress.     Detailed Neurologic Exam:    Mental status: The patient is awake and alert and has normal attention span.  The patient is fully oriented in 3 spheres. The patient is oriented to current events. The patient is able to name objects, follow commands, repeat sentences.    Cranial nerves: Pupils equal and react symmetrically to light. There is no visual field deficit to confrontation. Extraocular motion is full with no nystagmus. There is no ptosis. Facial sensation is intact. Facial musculature is symmetric. Palate elevates symmetrically. Tongue is midline.    Motor: There is normal bulk and tone.  There is no tremor.  Strength is 5/5 in the right arm and leg.   Strength is 5/5 in the left arm and leg.    Sensation: Intact to light touch     Reflexes: deferred    Cerebellar: There is no dysmetria on finger to nose testing.    Gait : deferred        LABS:                        14.2   7.44  )-----------( 215      ( 22 May 2024 03:19 )             41.8    05-22    139  |  106  |  14.1  ----------------------------<  88  3.5   |  21.0<L>  |  0.84    Ca    8.9      22 May 2024 03:19  Phos  3.0     05-22  Mg     2.2     05-22    TPro  7.2  /  Alb  4.7  /  TBili  1.1  /  DBili  x   /  AST  33  /  ALT  39  /  AlkPhos  66  05-21  PT/INR - ( 22 May 2024 03:19 )   PT: 11.6 sec;   INR: 1.05 ratio         PTT - ( 22 May 2024 03:19 )  PTT:36.6 sec      05-21 Chol 187 LDL -- HDL 39<L> Trig 165<H>    A1C: 6.3    IMAGING: Reviewed by me.       (05.21.24 @ 17:13)   IMPRESSION:    CT PERFUSION:  Technical limitations: None.    Core infarction: 0 ml  Penumbra / tissue at risk for active ischemia: 6 mL right MCA territory    CTA NECK:  No evidence of significant stenosis or occlusion.    CTA HEAD:  Focal thrombus suspected in the M1 segment of the right MCA without   complete occlusion. There is distal flow although this is slightly   reduced compared to the left side.  Moderate to severe focal stenosis in the proximal [id number] of the left   MCA.  Posterior circulation normal variants.      US Duplex Venous Lower Ext Complete, Bilateral (05.21.24 @ 21:11)   IMPRESSION:  No evidence of deep venous thrombosis in either lower extremity.    Xray Chest 1 View AP/PA. (05.21.24 @ 17:46)   IMPRESSION: No acute cardiopulmonary disease process.      TTE W or WO Ultrasound Enhancing Agent (05.21.24 @ 20:06)   CONCLUSIONS:      1. Technically difficult image quality.   2. Left ventricular cavity is normal in size. Left ventricular wall thickness is normal. Left ventricular systolic function is low normal with an ejection fraction visually estimated at 50 to 55 %.   3. The left ventricular diastolic function is indeterminate.   4. Normal right ventricular cavity size and normal systolic function.   5. The left atrium is normal in size.   6. The right atrium is normal in size.   7. Aortic valve anatomy cannot be determined with normal systolic excursion.   8. Estimated pulmonary artery systolic pressure is 14 mmHg.   9. No pericardial effusion seen.  10. No prior echocardiogram is available for comparison.  11. Indeterminate saline contrast injection results due to poor image quality.             [facility] Stroke Team  Progress Note     HPI:  59y male with no PMH presented to the [facility] ED on 05/21/24 for transient left sided weakness and numbness. Patient noticed the symptom earlier on the day of admission around 1430. The patient  was driving when he suddenly had an onset of left leg weakness which lasted a few minutes and then resolved. Later around 1530, he was speaking on the phone with a co worker when he started slurring his words. And prior to slurring episode he was noticed a transient left upper extremity numbness. He also noticed left facial droop. Upon arrival to the ED, NIHSS was noted to be 2 for  mild loss of nasolabial fold and mild left upper extremity numbness.   CT Head was unrevealing for any acute pathology, CTA H/N revealed a  focal thrombus in Right M1 with distal flow although reduced compared to the left side and CT perfusion revealed 6 ml penumbra in the right MCA region.    TNK was administered at 1727. The stroke Attending Dr. Vance and Neuro IR Dr. Mosquera discussed and decided that emergent mechanical thrombectomy intervention was not required at this time given the rapidly improving symptoms and low NIHSS. However should the patient clinically worsened, he may be taken to the IR suite for thrombectomy.     Of note, patient admitted to headache and fluctuating symptoms for the last 2 weeks. Patient stated that he had a recent visit with his cardiologist today who did an echo and recommended CT cardiac for calcium scoring.       SUBJECTIVE: No events overnight.  No new neurologic complaints.  ROS reported negative unless otherwise noted.      acetaminophen     Tablet .. 650 milliGRAM(s) Oral every 6 hours PRN  atorvastatin 80 milliGRAM(s) Oral at bedtime  chlorhexidine 2% Cloths 1 Application(s) Topical daily  chlorhexidine 2% Cloths 1 Application(s) Topical daily  mupirocin 2% Nasal 1 Application(s) Both Nostrils every 12 hours  polyethylene glycol 3350 17 Gram(s) Oral daily  potassium chloride    Tablet ER 40 milliEquivalent(s) Oral once  senna 2 Tablet(s) Oral at bedtime  sodium chloride 0.9% Bolus 500 milliLiter(s) IV Bolus once      PHYSICAL EXAM:   Vital Signs Last 24 Hrs  T(C): 36.6 (22 May 2024 11:08), Max: 37 (22 May 2024 07:43)  T(F): 97.8 (22 May 2024 11:08), Max: 98.6 (22 May 2024 07:43)  HR: 69 (22 May 2024 13:00) (55 - 87)  BP: 110/54 (22 May 2024 13:00) (110/54 - 155/94)  BP(mean): 71 (22 May 2024 13:00) (71 - 118)  RR: 21 (22 May 2024 13:00) (10 - 24)  SpO2: 98% (22 May 2024 13:00) (89% - 100%)    Parameters below as of 22 May 2024 12:00  Patient On (Oxygen Delivery Method): room air        General: No acute distress.     Detailed Neurologic Exam:    Mental status: The patient is awake and alert and has normal attention span.  The patient is fully oriented in 3 spheres. The patient is oriented to current events. The patient is able to name objects, follow commands, repeat sentences.    Cranial nerves: Pupils equal and react symmetrically to light. There is no visual field deficit to confrontation. Extraocular motion is full with no nystagmus. There is no ptosis. Facial sensation is intact. Facial musculature is symmetric. Palate elevates symmetrically. Tongue is midline.    Motor: There is normal bulk and tone.  There is no tremor.  Strength is 5/5 in the right arm and leg.   Strength is 5/5 in the left arm and leg.    Sensation: Intact to light touch     Reflexes: deferred    Cerebellar: There is no dysmetria on finger to nose testing.    Gait : deferred        LABS:                        14.2   7.44  )-----------( 215      ( 22 May 2024 03:19 )             41.8    05-22    139  |  106  |  14.1  ----------------------------<  88  3.5   |  21.0<L>  |  0.84    Ca    8.9      22 May 2024 03:19  Phos  3.0     05-22  Mg     2.2     05-22    TPro  7.2  /  Alb  4.7  /  TBili  1.1  /  DBili  x   /  AST  33  /  ALT  39  /  AlkPhos  66  05-21  PT/INR - ( 22 May 2024 03:19 )   PT: 11.6 sec;   INR: 1.05 ratio         PTT - ( 22 May 2024 03:19 )  PTT:36.6 sec      05-21 Chol 187 LDL -- HDL 39<L> Trig 165<H>    A1C: 6.3    IMAGING: Reviewed by me.       (05.21.24 @ 17:13)   IMPRESSION:    CT PERFUSION:  Technical limitations: None.    Core infarction: 0 ml  Penumbra / tissue at risk for active ischemia: 6 mL right MCA territory    CTA NECK:  No evidence of significant stenosis or occlusion.    CTA HEAD:  Focal thrombus suspected in the M1 segment of the right MCA without   complete occlusion. There is distal flow although this is slightly   reduced compared to the left side.  Moderate to severe focal stenosis in the proximal [id number] of the left   MCA.  Posterior circulation normal variants.      US Duplex Venous Lower Ext Complete, Bilateral (05.21.24 @ 21:11)   IMPRESSION:  No evidence of deep venous thrombosis in either lower extremity.    Xray Chest 1 View AP/PA. (05.21.24 @ 17:46)   IMPRESSION: No acute cardiopulmonary disease process.      TTE W or WO Ultrasound Enhancing Agent (05.21.24 @ 20:06)   CONCLUSIONS:      1. Technically difficult image quality.   2. Left ventricular cavity is normal in size. Left ventricular wall thickness is normal. Left ventricular systolic function is low normal with an ejection fraction visually estimated at 50 to 55 %.   3. The left ventricular diastolic function is indeterminate.   4. Normal right ventricular cavity size and normal systolic function.   5. The left atrium is normal in size.   6. The right atrium is normal in size.   7. Aortic valve anatomy cannot be determined with normal systolic excursion.   8. Estimated pulmonary artery systolic pressure is 14 mmHg.   9. No pericardial effusion seen.  10. No prior echocardiogram is available for comparison.  11. Indeterminate saline contrast injection results due to poor image quality.

## 2024-05-22 NOTE — SPEECH LANGUAGE PATHOLOGY EVALUATION - COMMENTS
WNL As per MD note: "59yoM presented with L sided weakness found to have R M1 stenosis." Please re-consult PRN

## 2024-05-22 NOTE — OCCUPATIONAL THERAPY INITIAL EVALUATION ADULT - DIAGNOSIS, OT EVAL
[FreeTextEntry1] : 61 y/o female presents with neck pain radiating down the right arm  for 3 months. No falls or accident   Pain:  8/10 Worse: 10/10 Quality: electricity  Frequency: constant The pain starst in the right side of neck and goes to the right hand .  Pain is unable to raise her right arm above the shoulder  The pain is worse with right rotation of neck  She has dropped objects from her right hand  Acetaminophen 500 mg 2 tablets po  x 2   CVA

## 2024-05-23 ENCOUNTER — TRANSCRIPTION ENCOUNTER (OUTPATIENT)
Age: 59
End: 2024-05-23

## 2024-05-23 VITALS
SYSTOLIC BLOOD PRESSURE: 123 MMHG | DIASTOLIC BLOOD PRESSURE: 66 MMHG | OXYGEN SATURATION: 96 % | RESPIRATION RATE: 15 BRPM | HEART RATE: 75 BPM

## 2024-05-23 LAB
ANION GAP SERPL CALC-SCNC: 12 MMOL/L — SIGNIFICANT CHANGE UP (ref 5–17)
BUN SERPL-MCNC: 16.2 MG/DL — SIGNIFICANT CHANGE UP (ref 8–20)
CALCIUM SERPL-MCNC: 8.9 MG/DL — SIGNIFICANT CHANGE UP (ref 8.4–10.5)
CHLORIDE SERPL-SCNC: 105 MMOL/L — SIGNIFICANT CHANGE UP (ref 96–108)
CO2 SERPL-SCNC: 24 MMOL/L — SIGNIFICANT CHANGE UP (ref 22–29)
CREAT SERPL-MCNC: 0.92 MG/DL — SIGNIFICANT CHANGE UP (ref 0.5–1.3)
EGFR: 95 ML/MIN/1.73M2 — SIGNIFICANT CHANGE UP
GLUCOSE SERPL-MCNC: 121 MG/DL — HIGH (ref 70–99)
HCT VFR BLD CALC: 41.8 % — SIGNIFICANT CHANGE UP (ref 39–50)
HGB BLD-MCNC: 14 G/DL — SIGNIFICANT CHANGE UP (ref 13–17)
MAGNESIUM SERPL-MCNC: 2.1 MG/DL — SIGNIFICANT CHANGE UP (ref 1.6–2.6)
MCHC RBC-ENTMCNC: 29.4 PG — SIGNIFICANT CHANGE UP (ref 27–34)
MCHC RBC-ENTMCNC: 33.5 GM/DL — SIGNIFICANT CHANGE UP (ref 32–36)
MCV RBC AUTO: 87.8 FL — SIGNIFICANT CHANGE UP (ref 80–100)
PHOSPHATE SERPL-MCNC: 3.5 MG/DL — SIGNIFICANT CHANGE UP (ref 2.4–4.7)
PLATELET # BLD AUTO: 203 K/UL — SIGNIFICANT CHANGE UP (ref 150–400)
POTASSIUM SERPL-MCNC: 4.1 MMOL/L — SIGNIFICANT CHANGE UP (ref 3.5–5.3)
POTASSIUM SERPL-SCNC: 4.1 MMOL/L — SIGNIFICANT CHANGE UP (ref 3.5–5.3)
RBC # BLD: 4.76 M/UL — SIGNIFICANT CHANGE UP (ref 4.2–5.8)
RBC # FLD: 13 % — SIGNIFICANT CHANGE UP (ref 10.3–14.5)
SODIUM SERPL-SCNC: 141 MMOL/L — SIGNIFICANT CHANGE UP (ref 135–145)
WBC # BLD: 7.4 K/UL — SIGNIFICANT CHANGE UP (ref 3.8–10.5)
WBC # FLD AUTO: 7.4 K/UL — SIGNIFICANT CHANGE UP (ref 3.8–10.5)

## 2024-05-23 PROCEDURE — 70551 MRI BRAIN STEM W/O DYE: CPT | Mod: 26

## 2024-05-23 RX ORDER — CLOPIDOGREL BISULFATE 75 MG/1
1 TABLET, FILM COATED ORAL
Qty: 31 | Refills: 0
Start: 2024-05-23 | End: 2024-06-22

## 2024-05-23 RX ORDER — ATORVASTATIN CALCIUM 80 MG/1
1 TABLET, FILM COATED ORAL
Qty: 31 | Refills: 0
Start: 2024-05-23 | End: 2024-06-22

## 2024-05-23 RX ORDER — ASPIRIN/CALCIUM CARB/MAGNESIUM 324 MG
1 TABLET ORAL
Qty: 31 | Refills: 0
Start: 2024-05-23 | End: 2024-06-22

## 2024-05-23 RX ORDER — POLYETHYLENE GLYCOL 3350 17 G/17G
17 POWDER, FOR SOLUTION ORAL
Qty: 0 | Refills: 0 | DISCHARGE
Start: 2024-05-23

## 2024-05-23 RX ORDER — ACETAMINOPHEN 500 MG
2 TABLET ORAL
Qty: 0 | Refills: 0 | DISCHARGE
Start: 2024-05-23

## 2024-05-23 RX ORDER — SENNA PLUS 8.6 MG/1
2 TABLET ORAL
Qty: 0 | Refills: 0 | DISCHARGE
Start: 2024-05-23

## 2024-05-23 RX ADMIN — CLOPIDOGREL BISULFATE 75 MILLIGRAM(S): 75 TABLET, FILM COATED ORAL at 11:04

## 2024-05-23 RX ADMIN — Medication 81 MILLIGRAM(S): at 11:05

## 2024-05-23 RX ADMIN — MUPIROCIN 1 APPLICATION(S): 20 OINTMENT TOPICAL at 06:30

## 2024-05-23 NOTE — DISCHARGE NOTE PROVIDER - PROVIDER TOKENS
PROVIDER:[TOKEN:[[id number]]],PROVIDER:[TOKEN:[[id number]]]

## 2024-05-23 NOTE — DISCHARGE NOTE PROVIDER - CARE PROVIDER_API CALL
Bakari Mosquera  Neurology  [address] [place] [address]  Phone: [phone]  Fax: [phone]  Follow Up Time:     Sarah Vance  Neurology  [address] [place] [id number]  Phone: [phone]  Fax: [phone]  Follow Up Time:

## 2024-05-23 NOTE — DISCHARGE NOTE PROVIDER - NSDCMRMEDTOKEN_GEN_ALL_CORE_FT
acetaminophen 325 mg oral tablet: 2 tab(s) orally every 6 hours As needed Temp greater or equal to 38C (100.4F), Mild Pain (1 - 3)  aspirin 81 mg oral delayed release tablet: 1 tab(s) orally once a day Use as Directed MDD: 1  atorvastatin 80 mg oral tablet: 1 tab(s) orally once a day (at bedtime) Use as Directed MDD: 1  clopidogrel 75 mg oral tablet: 1 tab(s) orally once a day Use as Directed MDD: 1  polyethylene glycol 3350 oral powder for reconstitution: 17 gram(s) orally once a day  senna leaf extract oral tablet: 2 tab(s) orally once a day (at bedtime)

## 2024-05-23 NOTE — DISCHARGE NOTE PROVIDER - NSDCCPCAREPLAN_GEN_ALL_CORE_FT
PRINCIPAL DISCHARGE DIAGNOSIS  Diagnosis: CVA (cerebrovascular accident)  Assessment and Plan of Treatment: You had a right m1 occlusion and were given tenectaplase on 5/21/24. You had repeat imaging which was stable and showed no hemorrhage. Please follow up with your Neurointerventionalist, Dr. Mosquera and Stroke neurologist, Dr. Vance within 1 week of discharge.    YOU HAVE BEEN STARTED ON ASPIRIN 81MG DAILY AND PLAVIX 75 MG DAILY. PLEASE FILL THESE MEDICATIONS AND TAKE AS DIRECTED. THESE MEDICATIONS ARE IMPORTANT FOR STENT PATENCY. DO NOT STOP TAKING THESE MEDCIATIONS   ** You are started on aspirin. Aspirin helps thin your blood.   Side effects of aspirin: brusing, easy bleeding abdominal pain  ** You are on plavix. Plavix helps thin the blood. It is important to continue this.   Side effects: bleeding, nosebleeds, easy bruising   ** You have been started on Atorvastatin, this medication is used as a secondary preventative measure and help lower your LDL.   Side effects: Muscle pain, diarrhea, headache.  Contact you PCP if muscle aches develop and do not resolve quickly.  Take OTC stool softener during recovery to help decrease intracranial pressure.   You have a skin puncture on your groin where the procedure was done, please monitor for excessive bruising, bleeding, palpable mass under the site and seek medical advice if present.   Please make all necessary appointments and follow up.   You may take Tylenol (acetaminophen) as needed for pain control.   Please DO NOT take any other NSAIDs (Advil, Aleve, Motrin, Ibuprofen) until cleared by your Neurosurgeon.   Please DO NOT do any heavy lifting, bending, twisting and straining.   You may shower, but NO SOAP / NO SHAMPOO. DO NOT do any scrubbing. Pat dry only.   Please come to the emergency room for any of the following: altered mental status, seizures, pain uncontrolled by pain medications, fevers, leaking / bleeding from groin puncture site chest pain and shortness of breath.

## 2024-05-23 NOTE — DISCHARGE NOTE PROVIDER - HOSPITAL COURSE
59y male no PMH presented to the [facility] ED on 05/21/24 for transient left sided weakness and numbness. CTA H/N revealed a Right M1 and CT perfusion revealed 6 ml penumbra.  TNK was administered at 1727. Patient had repeat CTH @ 24hrs which was stable. Patients hospital course was unremarkable. Physical Therapy and Occupational therapy evaluated patient and recommended no skilled needs. On the day of discharge patient is hemodynamically stable and medically cleared for discharge.

## 2024-05-28 ENCOUNTER — APPOINTMENT (OUTPATIENT)
Dept: CARDIOLOGY | Facility: CLINIC | Age: 59
End: 2024-05-28

## 2024-06-03 RX ORDER — ATORVASTATIN CALCIUM 80 MG/1
80 TABLET, FILM COATED ORAL
Qty: 30 | Refills: 5 | Status: ACTIVE | COMMUNITY
Start: 2024-06-03 | End: 1900-01-01

## 2024-06-03 RX ORDER — CLOPIDOGREL BISULFATE 75 MG/1
75 TABLET, FILM COATED ORAL DAILY
Qty: 90 | Refills: 2 | Status: ACTIVE | COMMUNITY
Start: 2024-06-03 | End: 1900-01-01

## 2024-06-06 ENCOUNTER — APPOINTMENT (OUTPATIENT)
Dept: NEUROLOGY | Facility: CLINIC | Age: 59
End: 2024-06-06
Payer: COMMERCIAL

## 2024-06-06 VITALS
DIASTOLIC BLOOD PRESSURE: 71 MMHG | BODY MASS INDEX: 30.88 KG/M2 | WEIGHT: 233 LBS | HEIGHT: 73 IN | SYSTOLIC BLOOD PRESSURE: 110 MMHG | OXYGEN SATURATION: 95 % | HEART RATE: 74 BPM

## 2024-06-06 DIAGNOSIS — G47.33 OBSTRUCTIVE SLEEP APNEA (ADULT) (PEDIATRIC): ICD-10-CM

## 2024-06-06 DIAGNOSIS — Z78.9 OTHER SPECIFIED HEALTH STATUS: ICD-10-CM

## 2024-06-06 DIAGNOSIS — Z82.3 FAMILY HISTORY OF STROKE: ICD-10-CM

## 2024-06-06 DIAGNOSIS — I63.9 CEREBRAL INFARCTION, UNSPECIFIED: ICD-10-CM

## 2024-06-06 DIAGNOSIS — Z77.22 CONTACT WITH AND (SUSPECTED) EXPOSURE TO ENVIRONMENTAL TOBACCO SMOKE (ACUTE) (CHRONIC): ICD-10-CM

## 2024-06-06 PROCEDURE — G2211 COMPLEX E/M VISIT ADD ON: CPT | Mod: NC,1L

## 2024-06-06 PROCEDURE — 99214 OFFICE O/P EST MOD 30 MIN: CPT

## 2024-06-06 RX ORDER — PNV NO.95/FERROUS FUM/FOLIC AC 28MG-0.8MG
TABLET ORAL
Refills: 0 | Status: ACTIVE | COMMUNITY

## 2024-06-06 NOTE — ASSESSMENT
[FreeTextEntry1] : 58 YO male with history of HLD (previously not on medication) presents today now about 2 weeks s/p acute posterior right temporal lobe cortex stroke in the setting of bilateral M1 segment stenosis, right > left s/p TNK. Etiology likely from symptomatic ICAD given severe RM1 occlusion/stenosis and contralateral LM1 mod-severe atherosclerosis. He has recovered very well with no focal neurological deficits on exam.   Patient reports history of depression prior to his stroke. He has spoken to various psychologists in the past. He is planning to reestablish care with a new psychologist soon.  PLAN: -Continue on ASA 81 mg and Plavix 75 mg daily for 3 months post stroke, then will transition to ASA monotherapy - Continue lipid lowering therapy - Strict blood pressure control- avoid hypoperfusion - MRA brain wo contrast with NOVA to look at cerebral blood flow closer. Will consider diagnostic angiogram post MRA - Sleep specialist referral- history of OTTONIEL  Follow up in 3 weeks.  All of the patient's questions and concerns were addressed.

## 2024-06-06 NOTE — HISTORY OF PRESENT ILLNESS
[FreeTextEntry1] : 58 YO male with history of HLD (previously not on medication) who presented to the [facility] ED on 05/21/24 for transient left sided weakness, numbness and facial droop, presents today for hospital follow up. CTA H/N revealed a suspected focal thrombus in the M1 segment of the right MCA without complete occlusion with distal flow slightly reduced compared to the left side and moderate to severe focal stenosis in the proximal M1 segment of the left MCA. MRI head revealed a subtle focus of diffusion restriction along the posterior right temporal lobe cortex, compatible with acute infarction. Patient received TNK, not candidate for mechanical thrombectomy given the rapidly improving symptoms and low NIHSS. Since the hospital, he has noticed some short-term memory issues and feels that he needs to concentrate more on what he is trying to say. He has been having headaches about every 3 days since the hospital but reports headaches prior to his stroke as well with intermittent shooting pain to the back of his head. He feels that his left side is back to baseline. No PT needed. Patient has completed coronary CTA and will follow up with cardiology for results. He has been compliant with DAPT and atorvastatin 80 mg daily. No significant signs of bleeding. He is independent with ADLs, driving, and back to working as a CPA. Denies recurrent episodes of transient left sided numbness and additional stroke/TIA symptoms.

## 2024-06-06 NOTE — PHYSICAL EXAM
[FreeTextEntry1] : GENERAL PHYSICAL EXAM: GEN: no distress, tearful at times   NEUROLOGICAL EXAM: Mental Status Orientation: alert and oriented to person, place, time, and situation Language: clear and fluent, intact comprehension and repetition. No dysarthria.   Cranial Nerves II: visual fields full to confrontation III, IV, VI: PERRL, EOMI V, VII: facial sensation and movement intact and symmetric IX, X: uvula midline, soft palate elevates normally XII: tongue midline   Motor 5/5 strength bilaterally in the UE and LE  Tone and bulk are normal in upper and lower limbs No pronator drift   Sensation Intact to light touch in all 4 EXTs   Coordination Normal FTN bilaterally No dysdiadochokinesia    Gait Normal ambulation with no imbalance Able to walk on his tippy toes, heels, and squat Negative Romberg

## 2024-06-10 ENCOUNTER — OUTPATIENT (OUTPATIENT)
Dept: OUTPATIENT SERVICES | Facility: HOSPITAL | Age: 59
LOS: 1 days | End: 2024-06-10
Payer: COMMERCIAL

## 2024-06-10 DIAGNOSIS — Z98.890 OTHER SPECIFIED POSTPROCEDURAL STATES: Chronic | ICD-10-CM

## 2024-06-10 DIAGNOSIS — I63.9 CEREBRAL INFARCTION, UNSPECIFIED: ICD-10-CM

## 2024-06-10 PROCEDURE — 70544 MR ANGIOGRAPHY HEAD W/O DYE: CPT

## 2024-06-10 PROCEDURE — 70544 MR ANGIOGRAPHY HEAD W/O DYE: CPT | Mod: 26

## 2024-06-18 ENCOUNTER — NON-APPOINTMENT (OUTPATIENT)
Age: 59
End: 2024-06-18

## 2024-06-26 ENCOUNTER — EMERGENCY (EMERGENCY)
Facility: HOSPITAL | Age: 59
LOS: 1 days | Discharge: HOME | End: 2024-06-26
Attending: STUDENT IN AN ORGANIZED HEALTH CARE EDUCATION/TRAINING PROGRAM
Payer: COMMERCIAL

## 2024-06-26 VITALS
HEART RATE: 57 BPM | OXYGEN SATURATION: 99 % | HEIGHT: 73 IN | DIASTOLIC BLOOD PRESSURE: 78 MMHG | TEMPERATURE: 98 F | RESPIRATION RATE: 18 BRPM | SYSTOLIC BLOOD PRESSURE: 135 MMHG | WEIGHT: 229.94 LBS

## 2024-06-26 DIAGNOSIS — Z98.890 OTHER SPECIFIED POSTPROCEDURAL STATES: Chronic | ICD-10-CM

## 2024-06-26 DIAGNOSIS — I25.10 ATHEROSCLEROTIC HEART DISEASE OF NATIVE CORONARY ARTERY W/OUT ANGINA PECTORIS: ICD-10-CM

## 2024-06-26 PROCEDURE — 99284 EMERGENCY DEPT VISIT MOD MDM: CPT

## 2024-06-26 PROCEDURE — 99283 EMERGENCY DEPT VISIT LOW MDM: CPT

## 2024-06-26 RX ORDER — TRANEXAMIC ACID 100 MG/ML
5 INJECTION, SOLUTION INTRAVENOUS ONCE
Refills: 0 | Status: COMPLETED | OUTPATIENT
Start: 2024-06-26 | End: 2024-06-26

## 2024-06-26 RX ADMIN — TRANEXAMIC ACID 5 MILLILITER(S): 100 INJECTION, SOLUTION INTRAVENOUS at 04:43

## 2024-06-27 ENCOUNTER — NON-APPOINTMENT (OUTPATIENT)
Age: 59
End: 2024-06-27

## 2024-07-01 ENCOUNTER — TRANSCRIPTION ENCOUNTER (OUTPATIENT)
Age: 59
End: 2024-07-01

## 2024-07-01 ENCOUNTER — OUTPATIENT (OUTPATIENT)
Dept: OUTPATIENT SERVICES | Facility: HOSPITAL | Age: 59
LOS: 1 days | End: 2024-07-01
Payer: COMMERCIAL

## 2024-07-01 VITALS
SYSTOLIC BLOOD PRESSURE: 154 MMHG | HEART RATE: 60 BPM | OXYGEN SATURATION: 99 % | DIASTOLIC BLOOD PRESSURE: 74 MMHG | RESPIRATION RATE: 19 BRPM

## 2024-07-01 VITALS
RESPIRATION RATE: 18 BRPM | DIASTOLIC BLOOD PRESSURE: 73 MMHG | HEART RATE: 63 BPM | TEMPERATURE: 98 F | SYSTOLIC BLOOD PRESSURE: 150 MMHG | OXYGEN SATURATION: 97 % | WEIGHT: 229.94 LBS | HEIGHT: 73 IN

## 2024-07-01 DIAGNOSIS — I25.10 ATHEROSCLEROTIC HEART DISEASE OF NATIVE CORONARY ARTERY WITHOUT ANGINA PECTORIS: ICD-10-CM

## 2024-07-01 DIAGNOSIS — Z98.890 OTHER SPECIFIED POSTPROCEDURAL STATES: Chronic | ICD-10-CM

## 2024-07-01 PROBLEM — I63.9 CEREBRAL INFARCTION, UNSPECIFIED: Chronic | Status: ACTIVE | Noted: 2024-06-26

## 2024-07-01 LAB
ANION GAP SERPL CALC-SCNC: 6 MMOL/L — SIGNIFICANT CHANGE UP (ref 5–17)
BUN SERPL-MCNC: 16 MG/DL — SIGNIFICANT CHANGE UP (ref 7–23)
CALCIUM SERPL-MCNC: 9.5 MG/DL — SIGNIFICANT CHANGE UP (ref 8.5–10.1)
CHLORIDE SERPL-SCNC: 109 MMOL/L — HIGH (ref 96–108)
CO2 SERPL-SCNC: 27 MMOL/L — SIGNIFICANT CHANGE UP (ref 22–31)
CREAT SERPL-MCNC: 1 MG/DL — SIGNIFICANT CHANGE UP (ref 0.5–1.3)
EGFR: 86 ML/MIN/1.73M2 — SIGNIFICANT CHANGE UP
GLUCOSE SERPL-MCNC: 139 MG/DL — HIGH (ref 70–99)
HCT VFR BLD CALC: 43 % — SIGNIFICANT CHANGE UP (ref 39–50)
HGB BLD-MCNC: 14 G/DL — SIGNIFICANT CHANGE UP (ref 13–17)
MCHC RBC-ENTMCNC: 29.7 PG — SIGNIFICANT CHANGE UP (ref 27–34)
MCHC RBC-ENTMCNC: 32.6 GM/DL — SIGNIFICANT CHANGE UP (ref 32–36)
MCV RBC AUTO: 91.1 FL — SIGNIFICANT CHANGE UP (ref 80–100)
NRBC # BLD: 0 /100 WBCS — SIGNIFICANT CHANGE UP (ref 0–0)
PLATELET # BLD AUTO: 227 K/UL — SIGNIFICANT CHANGE UP (ref 150–400)
POTASSIUM SERPL-MCNC: 3.7 MMOL/L — SIGNIFICANT CHANGE UP (ref 3.5–5.3)
POTASSIUM SERPL-SCNC: 3.7 MMOL/L — SIGNIFICANT CHANGE UP (ref 3.5–5.3)
RBC # BLD: 4.72 M/UL — SIGNIFICANT CHANGE UP (ref 4.2–5.8)
RBC # FLD: 13 % — SIGNIFICANT CHANGE UP (ref 10.3–14.5)
SODIUM SERPL-SCNC: 142 MMOL/L — SIGNIFICANT CHANGE UP (ref 135–145)
WBC # BLD: 7.98 K/UL — SIGNIFICANT CHANGE UP (ref 3.8–10.5)
WBC # FLD AUTO: 7.98 K/UL — SIGNIFICANT CHANGE UP (ref 3.8–10.5)

## 2024-07-01 PROCEDURE — 93458 L HRT ARTERY/VENTRICLE ANGIO: CPT | Mod: 26

## 2024-07-01 PROCEDURE — 85027 COMPLETE CBC AUTOMATED: CPT

## 2024-07-01 PROCEDURE — C1887: CPT

## 2024-07-01 PROCEDURE — 80048 BASIC METABOLIC PNL TOTAL CA: CPT

## 2024-07-01 PROCEDURE — C1760: CPT

## 2024-07-01 PROCEDURE — 99152 MOD SED SAME PHYS/QHP 5/>YRS: CPT

## 2024-07-01 PROCEDURE — C1769: CPT

## 2024-07-01 PROCEDURE — 93010 ELECTROCARDIOGRAM REPORT: CPT

## 2024-07-01 PROCEDURE — 93005 ELECTROCARDIOGRAM TRACING: CPT

## 2024-07-01 PROCEDURE — 36415 COLL VENOUS BLD VENIPUNCTURE: CPT

## 2024-07-01 PROCEDURE — C1894: CPT

## 2024-07-01 PROCEDURE — 93458 L HRT ARTERY/VENTRICLE ANGIO: CPT

## 2024-07-01 RX ORDER — SODIUM CHLORIDE 0.9 % (FLUSH) 0.9 %
1000 SYRINGE (ML) INJECTION
Refills: 0 | Status: COMPLETED | OUTPATIENT
Start: 2024-07-01 | End: 2024-07-01

## 2024-07-01 RX ORDER — SODIUM CHLORIDE 0.9 % (FLUSH) 0.9 %
250 SYRINGE (ML) INJECTION ONCE
Refills: 0 | Status: COMPLETED | OUTPATIENT
Start: 2024-07-01 | End: 2024-07-01

## 2024-07-01 RX ADMIN — Medication 75 MILLILITER(S): at 11:03

## 2024-07-01 RX ADMIN — Medication 500 MILLILITER(S): at 07:44

## 2024-07-02 ENCOUNTER — NON-APPOINTMENT (OUTPATIENT)
Age: 59
End: 2024-07-02

## 2024-07-02 ENCOUNTER — APPOINTMENT (OUTPATIENT)
Dept: CARDIOLOGY | Facility: CLINIC | Age: 59
End: 2024-07-02
Payer: COMMERCIAL

## 2024-07-02 ENCOUNTER — INPATIENT (INPATIENT)
Facility: HOSPITAL | Age: 59
LOS: 1 days | Discharge: HOME | DRG: 301 | End: 2024-07-04
Attending: FAMILY MEDICINE | Admitting: STUDENT IN AN ORGANIZED HEALTH CARE EDUCATION/TRAINING PROGRAM
Payer: COMMERCIAL

## 2024-07-02 VITALS
DIASTOLIC BLOOD PRESSURE: 75 MMHG | WEIGHT: 229.94 LBS | HEART RATE: 71 BPM | OXYGEN SATURATION: 96 % | SYSTOLIC BLOOD PRESSURE: 122 MMHG | HEIGHT: 73 IN | TEMPERATURE: 98 F | RESPIRATION RATE: 18 BRPM

## 2024-07-02 VITALS
DIASTOLIC BLOOD PRESSURE: 71 MMHG | HEART RATE: 70 BPM | SYSTOLIC BLOOD PRESSURE: 123 MMHG | WEIGHT: 233 LBS | BODY MASS INDEX: 30.88 KG/M2 | HEIGHT: 73 IN | OXYGEN SATURATION: 97 % | TEMPERATURE: 98.4 F

## 2024-07-02 DIAGNOSIS — I72.9 ANEURYSM OF UNSPECIFIED SITE: ICD-10-CM

## 2024-07-02 DIAGNOSIS — I72.4 ANEURYSM OF ARTERY OF LOWER EXTREMITY: ICD-10-CM

## 2024-07-02 DIAGNOSIS — Z98.890 OTHER SPECIFIED POSTPROCEDURAL STATES: Chronic | ICD-10-CM

## 2024-07-02 PROBLEM — G47.33 OBSTRUCTIVE SLEEP APNEA (ADULT) (PEDIATRIC): Chronic | Status: ACTIVE | Noted: 2024-07-01

## 2024-07-02 LAB
ALBUMIN SERPL ELPH-MCNC: 3.8 G/DL — SIGNIFICANT CHANGE UP (ref 3.3–5)
ALP SERPL-CCNC: 82 U/L — SIGNIFICANT CHANGE UP (ref 40–120)
ALT FLD-CCNC: 44 U/L — SIGNIFICANT CHANGE UP (ref 12–78)
ANION GAP SERPL CALC-SCNC: 7 MMOL/L — SIGNIFICANT CHANGE UP (ref 5–17)
APTT BLD: 35.6 SEC — SIGNIFICANT CHANGE UP (ref 24.5–35.6)
AST SERPL-CCNC: 21 U/L — SIGNIFICANT CHANGE UP (ref 15–37)
BASOPHILS # BLD AUTO: 0.01 K/UL — SIGNIFICANT CHANGE UP (ref 0–0.2)
BASOPHILS NFR BLD AUTO: 0.1 % — SIGNIFICANT CHANGE UP (ref 0–2)
BILIRUB SERPL-MCNC: 0.8 MG/DL — SIGNIFICANT CHANGE UP (ref 0.2–1.2)
BLD GP AB SCN SERPL QL: SIGNIFICANT CHANGE UP
BUN SERPL-MCNC: 14 MG/DL — SIGNIFICANT CHANGE UP (ref 7–23)
CALCIUM SERPL-MCNC: 9.1 MG/DL — SIGNIFICANT CHANGE UP (ref 8.5–10.1)
CHLORIDE SERPL-SCNC: 108 MMOL/L — SIGNIFICANT CHANGE UP (ref 96–108)
CO2 SERPL-SCNC: 26 MMOL/L — SIGNIFICANT CHANGE UP (ref 22–31)
CREAT SERPL-MCNC: 0.97 MG/DL — SIGNIFICANT CHANGE UP (ref 0.5–1.3)
EGFR: 89 ML/MIN/1.73M2 — SIGNIFICANT CHANGE UP
EOSINOPHIL # BLD AUTO: 0.12 K/UL — SIGNIFICANT CHANGE UP (ref 0–0.5)
EOSINOPHIL NFR BLD AUTO: 1.4 % — SIGNIFICANT CHANGE UP (ref 0–6)
GLUCOSE SERPL-MCNC: 108 MG/DL — HIGH (ref 70–99)
HCT VFR BLD CALC: 37.8 % — LOW (ref 39–50)
HGB BLD-MCNC: 12.3 G/DL — LOW (ref 13–17)
IMM GRANULOCYTES NFR BLD AUTO: 0.5 % — SIGNIFICANT CHANGE UP (ref 0–0.9)
INR BLD: 0.98 RATIO — SIGNIFICANT CHANGE UP (ref 0.85–1.18)
LYMPHOCYTES # BLD AUTO: 1.32 K/UL — SIGNIFICANT CHANGE UP (ref 1–3.3)
LYMPHOCYTES # BLD AUTO: 15.1 % — SIGNIFICANT CHANGE UP (ref 13–44)
MCHC RBC-ENTMCNC: 29.3 PG — SIGNIFICANT CHANGE UP (ref 27–34)
MCHC RBC-ENTMCNC: 32.5 GM/DL — SIGNIFICANT CHANGE UP (ref 32–36)
MCV RBC AUTO: 90 FL — SIGNIFICANT CHANGE UP (ref 80–100)
MONOCYTES # BLD AUTO: 0.8 K/UL — SIGNIFICANT CHANGE UP (ref 0–0.9)
MONOCYTES NFR BLD AUTO: 9.1 % — SIGNIFICANT CHANGE UP (ref 2–14)
NEUTROPHILS # BLD AUTO: 6.48 K/UL — SIGNIFICANT CHANGE UP (ref 1.8–7.4)
NEUTROPHILS NFR BLD AUTO: 73.8 % — SIGNIFICANT CHANGE UP (ref 43–77)
NRBC # BLD: 0 /100 WBCS — SIGNIFICANT CHANGE UP (ref 0–0)
PLATELET # BLD AUTO: 210 K/UL — SIGNIFICANT CHANGE UP (ref 150–400)
POTASSIUM SERPL-MCNC: 3.6 MMOL/L — SIGNIFICANT CHANGE UP (ref 3.5–5.3)
POTASSIUM SERPL-SCNC: 3.6 MMOL/L — SIGNIFICANT CHANGE UP (ref 3.5–5.3)
PROT SERPL-MCNC: 6.6 G/DL — SIGNIFICANT CHANGE UP (ref 6–8.3)
PROTHROM AB SERPL-ACNC: 11.5 SEC — SIGNIFICANT CHANGE UP (ref 9.5–13)
RBC # BLD: 4.2 M/UL — SIGNIFICANT CHANGE UP (ref 4.2–5.8)
RBC # FLD: 13.4 % — SIGNIFICANT CHANGE UP (ref 10.3–14.5)
SODIUM SERPL-SCNC: 141 MMOL/L — SIGNIFICANT CHANGE UP (ref 135–145)
WBC # BLD: 8.77 K/UL — SIGNIFICANT CHANGE UP (ref 3.8–10.5)
WBC # FLD AUTO: 8.77 K/UL — SIGNIFICANT CHANGE UP (ref 3.8–10.5)

## 2024-07-02 PROCEDURE — 76870 US EXAM SCROTUM: CPT | Mod: 26

## 2024-07-02 PROCEDURE — 99223 1ST HOSP IP/OBS HIGH 75: CPT | Mod: GC

## 2024-07-02 PROCEDURE — 74177 CT ABD & PELVIS W/CONTRAST: CPT | Mod: 26,MC

## 2024-07-02 PROCEDURE — 99221 1ST HOSP IP/OBS SF/LOW 40: CPT

## 2024-07-02 PROCEDURE — 93000 ELECTROCARDIOGRAM COMPLETE: CPT

## 2024-07-02 PROCEDURE — 99285 EMERGENCY DEPT VISIT HI MDM: CPT

## 2024-07-02 PROCEDURE — 93926 LOWER EXTREMITY STUDY: CPT | Mod: 26,RT

## 2024-07-02 PROCEDURE — 93975 VASCULAR STUDY: CPT | Mod: 26

## 2024-07-02 PROCEDURE — 99214 OFFICE O/P EST MOD 30 MIN: CPT

## 2024-07-02 RX ORDER — MORPHINE SULFATE 100 MG/1
4 TABLET, EXTENDED RELEASE ORAL ONCE
Refills: 0 | Status: DISCONTINUED | OUTPATIENT
Start: 2024-07-02 | End: 2024-07-02

## 2024-07-02 RX ORDER — MAGNESIUM, ALUMINUM HYDROXIDE 400-400
30 TABLET,CHEWABLE ORAL EVERY 4 HOURS
Refills: 0 | Status: DISCONTINUED | OUTPATIENT
Start: 2024-07-02 | End: 2024-07-04

## 2024-07-02 RX ORDER — ATORVASTATIN CALCIUM 20 MG/1
80 TABLET, FILM COATED ORAL AT BEDTIME
Refills: 0 | Status: DISCONTINUED | OUTPATIENT
Start: 2024-07-02 | End: 2024-07-04

## 2024-07-02 RX ORDER — ONDANSETRON HYDROCHLORIDE 2 MG/ML
4 INJECTION INTRAMUSCULAR; INTRAVENOUS ONCE
Refills: 0 | Status: COMPLETED | OUTPATIENT
Start: 2024-07-02 | End: 2024-07-02

## 2024-07-02 RX ORDER — ONDANSETRON HYDROCHLORIDE 2 MG/ML
4 INJECTION INTRAMUSCULAR; INTRAVENOUS EVERY 8 HOURS
Refills: 0 | Status: DISCONTINUED | OUTPATIENT
Start: 2024-07-02 | End: 2024-07-04

## 2024-07-02 RX ORDER — ACETAMINOPHEN 325 MG
1000 TABLET ORAL ONCE
Refills: 0 | Status: COMPLETED | OUTPATIENT
Start: 2024-07-02 | End: 2024-07-02

## 2024-07-02 RX ADMIN — ONDANSETRON HYDROCHLORIDE 4 MILLIGRAM(S): 2 INJECTION INTRAMUSCULAR; INTRAVENOUS at 22:09

## 2024-07-02 RX ADMIN — Medication 400 MILLIGRAM(S): at 20:28

## 2024-07-02 RX ADMIN — Medication 1000 MILLIGRAM(S): at 21:20

## 2024-07-02 RX ADMIN — MORPHINE SULFATE 4 MILLIGRAM(S): 100 TABLET, EXTENDED RELEASE ORAL at 22:09

## 2024-07-02 RX ADMIN — MORPHINE SULFATE 4 MILLIGRAM(S): 100 TABLET, EXTENDED RELEASE ORAL at 22:42

## 2024-07-03 DIAGNOSIS — N50.819 TESTICULAR PAIN, UNSPECIFIED: ICD-10-CM

## 2024-07-03 DIAGNOSIS — I63.9 CEREBRAL INFARCTION, UNSPECIFIED: ICD-10-CM

## 2024-07-03 DIAGNOSIS — Z29.9 ENCOUNTER FOR PROPHYLACTIC MEASURES, UNSPECIFIED: ICD-10-CM

## 2024-07-03 DIAGNOSIS — N50.811 RIGHT TESTICULAR PAIN: ICD-10-CM

## 2024-07-03 LAB
ALBUMIN SERPL ELPH-MCNC: 3.6 G/DL — SIGNIFICANT CHANGE UP (ref 3.3–5)
ALP SERPL-CCNC: 77 U/L — SIGNIFICANT CHANGE UP (ref 40–120)
ALT FLD-CCNC: 38 U/L — SIGNIFICANT CHANGE UP (ref 12–78)
ANION GAP SERPL CALC-SCNC: 5 MMOL/L — SIGNIFICANT CHANGE UP (ref 5–17)
APPEARANCE UR: CLEAR — SIGNIFICANT CHANGE UP
AST SERPL-CCNC: 18 U/L — SIGNIFICANT CHANGE UP (ref 15–37)
BILIRUB SERPL-MCNC: 1 MG/DL — SIGNIFICANT CHANGE UP (ref 0.2–1.2)
BILIRUB UR-MCNC: NEGATIVE — SIGNIFICANT CHANGE UP
BUN SERPL-MCNC: 12 MG/DL — SIGNIFICANT CHANGE UP (ref 7–23)
CALCIUM SERPL-MCNC: 8.7 MG/DL — SIGNIFICANT CHANGE UP (ref 8.5–10.1)
CHLORIDE SERPL-SCNC: 109 MMOL/L — HIGH (ref 96–108)
CO2 SERPL-SCNC: 27 MMOL/L — SIGNIFICANT CHANGE UP (ref 22–31)
COLOR SPEC: YELLOW — SIGNIFICANT CHANGE UP
CREAT SERPL-MCNC: 0.88 MG/DL — SIGNIFICANT CHANGE UP (ref 0.5–1.3)
DIFF PNL FLD: NEGATIVE — SIGNIFICANT CHANGE UP
EGFR: 98 ML/MIN/1.73M2 — SIGNIFICANT CHANGE UP
GLUCOSE SERPL-MCNC: 115 MG/DL — HIGH (ref 70–99)
GLUCOSE UR QL: NEGATIVE MG/DL — SIGNIFICANT CHANGE UP
HCT VFR BLD CALC: 33.6 % — LOW (ref 39–50)
HCT VFR BLD CALC: 36.5 % — LOW (ref 39–50)
HGB BLD-MCNC: 11.6 G/DL — LOW (ref 13–17)
HGB BLD-MCNC: 12 G/DL — LOW (ref 13–17)
KETONES UR-MCNC: 40 MG/DL
LEUKOCYTE ESTERASE UR-ACNC: NEGATIVE — SIGNIFICANT CHANGE UP
MCHC RBC-ENTMCNC: 29.7 PG — SIGNIFICANT CHANGE UP (ref 27–34)
MCHC RBC-ENTMCNC: 32.9 GM/DL — SIGNIFICANT CHANGE UP (ref 32–36)
MCV RBC AUTO: 90.3 FL — SIGNIFICANT CHANGE UP (ref 80–100)
NITRITE UR-MCNC: NEGATIVE — SIGNIFICANT CHANGE UP
NRBC # BLD: 0 /100 WBCS — SIGNIFICANT CHANGE UP (ref 0–0)
PH UR: 6 — SIGNIFICANT CHANGE UP (ref 5–8)
PLATELET # BLD AUTO: 202 K/UL — SIGNIFICANT CHANGE UP (ref 150–400)
POTASSIUM SERPL-MCNC: 3.9 MMOL/L — SIGNIFICANT CHANGE UP (ref 3.5–5.3)
POTASSIUM SERPL-SCNC: 3.9 MMOL/L — SIGNIFICANT CHANGE UP (ref 3.5–5.3)
PROT SERPL-MCNC: 6.3 G/DL — SIGNIFICANT CHANGE UP (ref 6–8.3)
PROT UR-MCNC: NEGATIVE MG/DL — SIGNIFICANT CHANGE UP
RBC # BLD: 4.04 M/UL — LOW (ref 4.2–5.8)
RBC # FLD: 13.3 % — SIGNIFICANT CHANGE UP (ref 10.3–14.5)
SODIUM SERPL-SCNC: 141 MMOL/L — SIGNIFICANT CHANGE UP (ref 135–145)
SP GR SPEC: 1.02 — SIGNIFICANT CHANGE UP (ref 1–1.03)
UROBILINOGEN FLD QL: 1 MG/DL — SIGNIFICANT CHANGE UP (ref 0.2–1)
WBC # BLD: 9.95 K/UL — SIGNIFICANT CHANGE UP (ref 3.8–10.5)
WBC # FLD AUTO: 9.95 K/UL — SIGNIFICANT CHANGE UP (ref 3.8–10.5)

## 2024-07-03 PROCEDURE — 99233 SBSQ HOSP IP/OBS HIGH 50: CPT

## 2024-07-03 PROCEDURE — 93926 LOWER EXTREMITY STUDY: CPT | Mod: 26,RT

## 2024-07-03 PROCEDURE — 93926 LOWER EXTREMITY STUDY: CPT | Mod: 26,77,RT

## 2024-07-03 PROCEDURE — 74176 CT ABD & PELVIS W/O CONTRAST: CPT | Mod: 26

## 2024-07-03 PROCEDURE — 76942 ECHO GUIDE FOR BIOPSY: CPT | Mod: 26

## 2024-07-03 PROCEDURE — 99232 SBSQ HOSP IP/OBS MODERATE 35: CPT

## 2024-07-03 RX ORDER — SODIUM CHLORIDE 0.9 % (FLUSH) 0.9 %
1000 SYRINGE (ML) INJECTION
Refills: 0 | Status: DISCONTINUED | OUTPATIENT
Start: 2024-07-03 | End: 2024-07-04

## 2024-07-03 RX ORDER — CALCIUM CARBONATE 500(1250)
1 TABLET,CHEWABLE ORAL ONCE
Refills: 0 | Status: COMPLETED | OUTPATIENT
Start: 2024-07-03 | End: 2024-07-03

## 2024-07-03 RX ORDER — THROMBIN (BOVINE) 1000 UNIT
5000 VIAL (EA) TOPICAL ONCE
Refills: 0 | Status: COMPLETED | OUTPATIENT
Start: 2024-07-03 | End: 2024-07-03

## 2024-07-03 RX ORDER — MORPHINE SULFATE 100 MG/1
2 TABLET, EXTENDED RELEASE ORAL EVERY 4 HOURS
Refills: 0 | Status: DISCONTINUED | OUTPATIENT
Start: 2024-07-03 | End: 2024-07-04

## 2024-07-03 RX ORDER — CEFTRIAXONE SODIUM 500 MG
VIAL (EA) INJECTION
Refills: 0 | Status: DISCONTINUED | OUTPATIENT
Start: 2024-07-03 | End: 2024-07-04

## 2024-07-03 RX ORDER — CEFTRIAXONE SODIUM 500 MG
1000 VIAL (EA) INJECTION ONCE
Refills: 0 | Status: COMPLETED | OUTPATIENT
Start: 2024-07-03 | End: 2024-07-03

## 2024-07-03 RX ORDER — MORPHINE SULFATE 100 MG/1
2 TABLET, EXTENDED RELEASE ORAL EVERY 6 HOURS
Refills: 0 | Status: DISCONTINUED | OUTPATIENT
Start: 2024-07-03 | End: 2024-07-03

## 2024-07-03 RX ORDER — OXYMETAZOLINE HYDROCHLORIDE 0.05 G/100ML
2 SPRAY NASAL ONCE
Refills: 0 | Status: COMPLETED | OUTPATIENT
Start: 2024-07-03 | End: 2024-07-03

## 2024-07-03 RX ORDER — CEFTRIAXONE SODIUM 500 MG
1000 VIAL (EA) INJECTION EVERY 24 HOURS
Refills: 0 | Status: DISCONTINUED | OUTPATIENT
Start: 2024-07-04 | End: 2024-07-04

## 2024-07-03 RX ORDER — MORPHINE SULFATE 100 MG/1
4 TABLET, EXTENDED RELEASE ORAL ONCE
Refills: 0 | Status: DISCONTINUED | OUTPATIENT
Start: 2024-07-03 | End: 2024-07-03

## 2024-07-03 RX ORDER — MORPHINE SULFATE 100 MG/1
4 TABLET, EXTENDED RELEASE ORAL EVERY 6 HOURS
Refills: 0 | Status: DISCONTINUED | OUTPATIENT
Start: 2024-07-03 | End: 2024-07-04

## 2024-07-03 RX ORDER — MORPHINE SULFATE 100 MG/1
2 TABLET, EXTENDED RELEASE ORAL ONCE
Refills: 0 | Status: DISCONTINUED | OUTPATIENT
Start: 2024-07-03 | End: 2024-07-03

## 2024-07-03 RX ORDER — MORPHINE SULFATE 100 MG/1
1 TABLET, EXTENDED RELEASE ORAL EVERY 4 HOURS
Refills: 0 | Status: DISCONTINUED | OUTPATIENT
Start: 2024-07-03 | End: 2024-07-03

## 2024-07-03 RX ADMIN — Medication 100 MILLIGRAM(S): at 22:20

## 2024-07-03 RX ADMIN — MORPHINE SULFATE 4 MILLIGRAM(S): 100 TABLET, EXTENDED RELEASE ORAL at 12:49

## 2024-07-03 RX ADMIN — Medication 5000 INTERNATIONAL UNIT(S): at 16:48

## 2024-07-03 RX ADMIN — Medication 1000 MILLIGRAM(S): at 00:00

## 2024-07-03 RX ADMIN — MORPHINE SULFATE 4 MILLIGRAM(S): 100 TABLET, EXTENDED RELEASE ORAL at 13:05

## 2024-07-03 RX ADMIN — ATORVASTATIN CALCIUM 80 MILLIGRAM(S): 20 TABLET, FILM COATED ORAL at 22:13

## 2024-07-03 RX ADMIN — ONDANSETRON HYDROCHLORIDE 4 MILLIGRAM(S): 2 INJECTION INTRAMUSCULAR; INTRAVENOUS at 10:57

## 2024-07-03 RX ADMIN — Medication 100 MILLILITER(S): at 03:01

## 2024-07-03 RX ADMIN — Medication 1 TABLET(S): at 22:20

## 2024-07-03 RX ADMIN — MORPHINE SULFATE 4 MILLIGRAM(S): 100 TABLET, EXTENDED RELEASE ORAL at 05:29

## 2024-07-03 RX ADMIN — OXYMETAZOLINE HYDROCHLORIDE 2 SPRAY(S): 0.05 SPRAY NASAL at 02:57

## 2024-07-04 ENCOUNTER — TRANSCRIPTION ENCOUNTER (OUTPATIENT)
Age: 59
End: 2024-07-04

## 2024-07-04 VITALS
RESPIRATION RATE: 18 BRPM | DIASTOLIC BLOOD PRESSURE: 72 MMHG | TEMPERATURE: 98 F | OXYGEN SATURATION: 92 % | HEART RATE: 62 BPM | SYSTOLIC BLOOD PRESSURE: 123 MMHG

## 2024-07-04 LAB
ANION GAP SERPL CALC-SCNC: 5 MMOL/L — SIGNIFICANT CHANGE UP (ref 5–17)
BUN SERPL-MCNC: 11 MG/DL — SIGNIFICANT CHANGE UP (ref 7–23)
CALCIUM SERPL-MCNC: 9 MG/DL — SIGNIFICANT CHANGE UP (ref 8.5–10.1)
CHLORIDE SERPL-SCNC: 108 MMOL/L — SIGNIFICANT CHANGE UP (ref 96–108)
CO2 SERPL-SCNC: 29 MMOL/L — SIGNIFICANT CHANGE UP (ref 22–31)
CREAT SERPL-MCNC: 0.86 MG/DL — SIGNIFICANT CHANGE UP (ref 0.5–1.3)
EGFR: 99 ML/MIN/1.73M2 — SIGNIFICANT CHANGE UP
GLUCOSE SERPL-MCNC: 114 MG/DL — HIGH (ref 70–99)
HCT VFR BLD CALC: 35.4 % — LOW (ref 39–50)
HGB BLD-MCNC: 11.4 G/DL — LOW (ref 13–17)
MCHC RBC-ENTMCNC: 29.2 PG — SIGNIFICANT CHANGE UP (ref 27–34)
MCHC RBC-ENTMCNC: 32.2 GM/DL — SIGNIFICANT CHANGE UP (ref 32–36)
MCV RBC AUTO: 90.8 FL — SIGNIFICANT CHANGE UP (ref 80–100)
NRBC # BLD: 0 /100 WBCS — SIGNIFICANT CHANGE UP (ref 0–0)
PLATELET # BLD AUTO: 212 K/UL — SIGNIFICANT CHANGE UP (ref 150–400)
POTASSIUM SERPL-MCNC: 3.8 MMOL/L — SIGNIFICANT CHANGE UP (ref 3.5–5.3)
POTASSIUM SERPL-SCNC: 3.8 MMOL/L — SIGNIFICANT CHANGE UP (ref 3.5–5.3)
RBC # BLD: 3.9 M/UL — LOW (ref 4.2–5.8)
RBC # FLD: 13.3 % — SIGNIFICANT CHANGE UP (ref 10.3–14.5)
SODIUM SERPL-SCNC: 142 MMOL/L — SIGNIFICANT CHANGE UP (ref 135–145)
WBC # BLD: 7.7 K/UL — SIGNIFICANT CHANGE UP (ref 3.8–10.5)
WBC # FLD AUTO: 7.7 K/UL — SIGNIFICANT CHANGE UP (ref 3.8–10.5)

## 2024-07-04 PROCEDURE — 86900 BLOOD TYPING SEROLOGIC ABO: CPT

## 2024-07-04 PROCEDURE — 99232 SBSQ HOSP IP/OBS MODERATE 35: CPT

## 2024-07-04 PROCEDURE — 85025 COMPLETE CBC W/AUTO DIFF WBC: CPT

## 2024-07-04 PROCEDURE — 93926 LOWER EXTREMITY STUDY: CPT | Mod: 26,RT

## 2024-07-04 PROCEDURE — 87086 URINE CULTURE/COLONY COUNT: CPT

## 2024-07-04 PROCEDURE — 93926 LOWER EXTREMITY STUDY: CPT

## 2024-07-04 PROCEDURE — 99222 1ST HOSP IP/OBS MODERATE 55: CPT

## 2024-07-04 PROCEDURE — 99285 EMERGENCY DEPT VISIT HI MDM: CPT

## 2024-07-04 PROCEDURE — 76942 ECHO GUIDE FOR BIOPSY: CPT

## 2024-07-04 PROCEDURE — 76870 US EXAM SCROTUM: CPT

## 2024-07-04 PROCEDURE — 93975 VASCULAR STUDY: CPT

## 2024-07-04 PROCEDURE — 86850 RBC ANTIBODY SCREEN: CPT

## 2024-07-04 PROCEDURE — 80048 BASIC METABOLIC PNL TOTAL CA: CPT

## 2024-07-04 PROCEDURE — 85027 COMPLETE CBC AUTOMATED: CPT

## 2024-07-04 PROCEDURE — 81003 URINALYSIS AUTO W/O SCOPE: CPT

## 2024-07-04 PROCEDURE — 99231 SBSQ HOSP IP/OBS SF/LOW 25: CPT

## 2024-07-04 PROCEDURE — 96376 TX/PRO/DX INJ SAME DRUG ADON: CPT

## 2024-07-04 PROCEDURE — 86901 BLOOD TYPING SEROLOGIC RH(D): CPT

## 2024-07-04 PROCEDURE — 99239 HOSP IP/OBS DSCHRG MGMT >30: CPT

## 2024-07-04 PROCEDURE — 74177 CT ABD & PELVIS W/CONTRAST: CPT | Mod: MC

## 2024-07-04 PROCEDURE — 85610 PROTHROMBIN TIME: CPT

## 2024-07-04 PROCEDURE — 85730 THROMBOPLASTIN TIME PARTIAL: CPT

## 2024-07-04 PROCEDURE — 80053 COMPREHEN METABOLIC PANEL: CPT

## 2024-07-04 PROCEDURE — 85018 HEMOGLOBIN: CPT

## 2024-07-04 PROCEDURE — 96374 THER/PROPH/DIAG INJ IV PUSH: CPT

## 2024-07-04 PROCEDURE — 74176 CT ABD & PELVIS W/O CONTRAST: CPT | Mod: MC

## 2024-07-04 PROCEDURE — 36415 COLL VENOUS BLD VENIPUNCTURE: CPT

## 2024-07-04 PROCEDURE — 85014 HEMATOCRIT: CPT

## 2024-07-04 RX ORDER — ASPIRIN 325 MG/1
81 TABLET, FILM COATED ORAL DAILY
Refills: 0 | Status: DISCONTINUED | OUTPATIENT
Start: 2024-07-04 | End: 2024-07-04

## 2024-07-04 RX ORDER — TRAMADOL HYDROCHLORIDE 50 MG/1
0.5 TABLET, FILM COATED ORAL
Qty: 10 | Refills: 0
Start: 2024-07-04 | End: 2024-07-08

## 2024-07-04 RX ORDER — CLOPIDOGREL BISULFATE 75 MG/1
75 TABLET, FILM COATED ORAL DAILY
Refills: 0 | Status: DISCONTINUED | OUTPATIENT
Start: 2024-07-04 | End: 2024-07-04

## 2024-07-04 RX ADMIN — Medication 100 MILLIGRAM(S): at 18:11

## 2024-07-05 LAB
CULTURE RESULTS: NO GROWTH — SIGNIFICANT CHANGE UP
SPECIMEN SOURCE: SIGNIFICANT CHANGE UP

## 2024-07-05 RX ORDER — LEVOFLOXACIN 25 MG/ML
1 INJECTION INTRAVENOUS
Qty: 5 | Refills: 0
Start: 2024-07-05 | End: 2024-07-09

## 2024-07-10 ENCOUNTER — APPOINTMENT (OUTPATIENT)
Dept: NEUROLOGY | Facility: CLINIC | Age: 59
End: 2024-07-10
Payer: COMMERCIAL

## 2024-07-10 VITALS
OXYGEN SATURATION: 97 % | HEART RATE: 93 BPM | SYSTOLIC BLOOD PRESSURE: 126 MMHG | HEIGHT: 73 IN | BODY MASS INDEX: 30.48 KG/M2 | WEIGHT: 230 LBS | DIASTOLIC BLOOD PRESSURE: 74 MMHG

## 2024-07-10 DIAGNOSIS — I63.9 CEREBRAL INFARCTION, UNSPECIFIED: ICD-10-CM

## 2024-07-10 PROCEDURE — 99215 OFFICE O/P EST HI 40 MIN: CPT

## 2024-07-10 PROCEDURE — G2211 COMPLEX E/M VISIT ADD ON: CPT | Mod: NC

## 2024-07-12 ENCOUNTER — APPOINTMENT (OUTPATIENT)
Dept: VASCULAR SURGERY | Facility: CLINIC | Age: 59
End: 2024-07-12
Payer: COMMERCIAL

## 2024-07-12 ENCOUNTER — NON-APPOINTMENT (OUTPATIENT)
Age: 59
End: 2024-07-12

## 2024-07-12 VITALS
SYSTOLIC BLOOD PRESSURE: 135 MMHG | DIASTOLIC BLOOD PRESSURE: 79 MMHG | OXYGEN SATURATION: 96 % | TEMPERATURE: 98 F | WEIGHT: 226 LBS | RESPIRATION RATE: 16 BRPM | HEART RATE: 77 BPM | BODY MASS INDEX: 29.95 KG/M2 | HEIGHT: 73 IN

## 2024-07-12 DIAGNOSIS — I72.9 ANEURYSM OF UNSPECIFIED SITE: ICD-10-CM

## 2024-07-12 PROCEDURE — 99213 OFFICE O/P EST LOW 20 MIN: CPT

## 2024-07-12 PROCEDURE — 93926 LOWER EXTREMITY STUDY: CPT

## 2024-07-15 ENCOUNTER — APPOINTMENT (OUTPATIENT)
Dept: NEUROLOGY | Facility: CLINIC | Age: 59
End: 2024-07-15

## 2024-10-16 ENCOUNTER — APPOINTMENT (OUTPATIENT)
Dept: NEUROLOGY | Facility: CLINIC | Age: 59
End: 2024-10-16

## 2024-11-13 ENCOUNTER — NON-APPOINTMENT (OUTPATIENT)
Age: 59
End: 2024-11-13

## 2024-11-15 ENCOUNTER — APPOINTMENT (OUTPATIENT)
Dept: CARDIOLOGY | Facility: CLINIC | Age: 59
End: 2024-11-15
Payer: COMMERCIAL

## 2024-11-15 PROCEDURE — 93015 CV STRESS TEST SUPVJ I&R: CPT

## 2024-11-15 PROCEDURE — A9500: CPT

## 2024-11-15 PROCEDURE — 78452 HT MUSCLE IMAGE SPECT MULT: CPT

## 2024-11-19 ENCOUNTER — APPOINTMENT (OUTPATIENT)
Dept: CARDIOLOGY | Facility: CLINIC | Age: 59
End: 2024-11-19
Payer: COMMERCIAL

## 2024-11-19 ENCOUNTER — NON-APPOINTMENT (OUTPATIENT)
Age: 59
End: 2024-11-19

## 2024-11-19 VITALS
DIASTOLIC BLOOD PRESSURE: 79 MMHG | HEART RATE: 66 BPM | BODY MASS INDEX: 31.28 KG/M2 | WEIGHT: 236 LBS | SYSTOLIC BLOOD PRESSURE: 138 MMHG | HEIGHT: 73 IN | OXYGEN SATURATION: 93 %

## 2024-11-19 DIAGNOSIS — R07.89 OTHER CHEST PAIN: ICD-10-CM

## 2024-11-19 DIAGNOSIS — I25.10 ATHEROSCLEROTIC HEART DISEASE OF NATIVE CORONARY ARTERY W/OUT ANGINA PECTORIS: ICD-10-CM

## 2024-11-19 PROCEDURE — 99214 OFFICE O/P EST MOD 30 MIN: CPT

## 2024-11-19 PROCEDURE — 93000 ELECTROCARDIOGRAM COMPLETE: CPT

## 2024-11-19 RX ORDER — METOPROLOL SUCCINATE 25 MG/1
25 TABLET, EXTENDED RELEASE ORAL
Qty: 90 | Refills: 0 | Status: ACTIVE | COMMUNITY
Start: 2024-11-19 | End: 1900-01-01

## 2024-11-19 RX ORDER — ISOSORBIDE MONONITRATE 30 MG/1
30 TABLET, EXTENDED RELEASE ORAL
Qty: 90 | Refills: 0 | Status: ACTIVE | COMMUNITY
Start: 2024-11-19 | End: 1900-01-01

## 2024-11-22 ENCOUNTER — APPOINTMENT (OUTPATIENT)
Dept: CARDIOLOGY | Facility: CLINIC | Age: 59
End: 2024-11-22
Payer: COMMERCIAL

## 2024-11-22 ENCOUNTER — MED ADMIN CHARGE (OUTPATIENT)
Age: 59
End: 2024-11-22

## 2024-11-22 PROCEDURE — 93306 TTE W/DOPPLER COMPLETE: CPT

## 2024-11-22 RX ORDER — PERFLUTREN 6.52 MG/ML
6.52 INJECTION, SUSPENSION INTRAVENOUS
Qty: 2 | Refills: 0 | Status: COMPLETED | OUTPATIENT
Start: 2024-11-22

## 2024-11-22 RX ADMIN — PERFLUTREN MG/ML: 6.52 INJECTION, SUSPENSION INTRAVENOUS at 00:00

## 2024-12-09 VITALS
RESPIRATION RATE: 19 BRPM | WEIGHT: 225.31 LBS | DIASTOLIC BLOOD PRESSURE: 61 MMHG | HEIGHT: 73 IN | OXYGEN SATURATION: 97 % | TEMPERATURE: 98 F | HEART RATE: 62 BPM | SYSTOLIC BLOOD PRESSURE: 114 MMHG

## 2024-12-09 RX ORDER — SODIUM CHLORIDE 9 MG/ML
250 INJECTION, SOLUTION INTRAMUSCULAR; INTRAVENOUS; SUBCUTANEOUS ONCE
Refills: 0 | Status: COMPLETED | OUTPATIENT
Start: 2024-12-10 | End: 2024-12-10

## 2024-12-09 RX ORDER — SODIUM CHLORIDE 9 MG/ML
500 INJECTION, SOLUTION INTRAMUSCULAR; INTRAVENOUS; SUBCUTANEOUS
Refills: 0 | Status: DISCONTINUED | OUTPATIENT
Start: 2024-12-10 | End: 2024-12-11

## 2024-12-09 NOTE — H&P CARDIOLOGY - EKG AND INTERPRETATION
Sinus bradycardia @ 58 bpm; Normal axis and intervals; non-ischemic EKG  CT: 136ms  QRS duration: 86ms  QT/QTc Bazetts: 454/445  PRT: axes 72/66/56 12/10/2024: Sinus bradycardia @ 58 bpm; Normal axis and intervals; non-ischemic EKG  NV: 136ms  QRS duration: 86ms  QT/QTc Bazetts: 454/445  PRT: axes 72/66/56

## 2024-12-09 NOTE — H&P CARDIOLOGY - HISTORY OF PRESENT ILLNESS
59 year old male with PMH CAD, HLD, ischemic CVA, Arthralgia of left acromioclavicular joint, OTTONIEL, pseudoaneurysm, Tear of left supraspinatus tendon.    11/15/24 nuclear stress test  Conclusions:  1. Normal myocardial perfusion scan, with no evidence of infarction or inducible ischemia.  2. The EKG changes noted at peak exercise do not fulfill criteria for myocardial ischemia but the test was positive for exercise induced chest pain, relieved with rest.  3. The patient underwent stress testing using the standard Jose protocol.  • The patient exercised for 7 min 31 sec.  • The test was stopped due to chest pain.  • The peak heart rate was 142 bpm; 90 % of predicted maximal heart rate for this patient.  • The patient achieved 10.1 METS which is consistent with good exercise capacity considering age and other clinical characteristics.  4. The post stress left ventricular EF is 72 %. The stress end diastolic volume is 106 ml and systolic volume is 30 ml.    24 cardiology appt with Dr Moore:  His recent blood work demonstrated an A1c of 6.8, and an LDL of 151. His father had significant cardiac issues, with multiple CVA/MI and was disabled by the age of 52, passing away at 78. His mother had diabetes. He works as an  and is not particularly active. At last visit, he was reporting dyspnea one exertion/chest pain, along with some focal neurologic numbness.  In , he had a posterior right temporal CVA, s/p TNK.  Cardiac CTA with , with multivessel CAD. He is now s/p cardiac cath with moderate atherosclerosis. The test was complicated by a right CFA pseudoaneurysm. He is now on asa and plavix, along with statin and zetia.  He reports chest pain on exertion, and dyspnea. Perfusion was normal on a nuclear stress test, though he had chest pain/dyspnea and very mild EKG changes.  CTA of his head noted severe focal right M1 segment MCA stenosis.   EC2024: Sinus rhythm, nonspecific ST abnormality.      24 TTE:  CONCLUSIONS:  1. Technically difficult image quality.  2. Left ventricular systolic function is low normal with an ejection fraction of 52 % by Nemwan's method of disks.  3. There is mild (grade 1) left ventricular diastolic dysfunction, with normal left ventricular filling pressure.  4. There is normal LV mass and concentric remodeling.  5. Normal right ventricular cavity size and normal right ventricular systolic function.  6. Left atrium is normal in size.  7. Mitral valve leaflets have focal calcification.  8. Trace mitral regurgitation.  9. Trileaflet aortic valve with normal systolic excursion. There is focal calcification of the aortic valve leaflets.  10. Estimated pulmonary artery systolic pressure is 13 mmHg, consistent with normal pulmonary artery pressure.  11. Compared to the transthoracic echocardiogram performed on 2024, there have been no significant interval changes    12/10/24 Pt presents today for elective LHC with Dr Richardson. Feeling well, no c/o offered. Denies CP, SOB, palpitations, N/V, fever/chills, abd pain, numbness/tingling/weakness, other c/o at this time.  59 year old male with PMH CAD, HLD, ischemic CVA, Arthralgia of left acromioclavicular joint, OTTONIEL, pseudoaneurysm, Tear of left supraspinatus tendon.    11/15/24 nuclear stress test  Conclusions:  1. Normal myocardial perfusion scan, with no evidence of infarction or inducible ischemia.  2. The EKG changes noted at peak exercise do not fulfill criteria for myocardial ischemia but the test was positive for exercise induced chest pain, relieved with rest.  3. The patient underwent stress testing using the standard Jose protocol.  • The patient exercised for 7 min 31 sec.  • The test was stopped due to chest pain.  • The peak heart rate was 142 bpm; 90 % of predicted maximal heart rate for this patient.  • The patient achieved 10.1 METS which is consistent with good exercise capacity considering age and other clinical characteristics.  4. The post stress left ventricular EF is 72 %. The stress end diastolic volume is 106 ml and systolic volume is 30 ml.    24 cardiology appt with Dr Moore:  His recent blood work demonstrated an A1c of 6.8, and an LDL of 151. His father had significant cardiac issues, with multiple CVA/MI and was disabled by the age of 52, passing away at 78. His mother had diabetes. He works as an  and is not particularly active. At last visit, he was reporting dyspnea one exertion/chest pain, along with some focal neurologic numbness.  In , he had a posterior right temporal CVA, s/p TNK.  Cardiac CTA with , with multivessel CAD. He is now s/p cardiac cath with moderate atherosclerosis. The test was complicated by a right CFA pseudoaneurysm. 2024 patient had  He is now on asa and plavix, along with statin and zetia.  He reports chest pain on exertion, and dyspnea. Perfusion was normal on a nuclear stress test, though he had chest pain/dyspnea and very mild EKG changes.  CTA of his head noted severe focal right M1 segment MCA stenosis.   EC2024: Sinus rhythm, nonspecific ST abnormality.      24 TTE:  CONCLUSIONS:  1. Technically difficult image quality.  2. Left ventricular systolic function is low normal with an ejection fraction of 52 % by Newman's method of disks.  3. There is mild (grade 1) left ventricular diastolic dysfunction, with normal left ventricular filling pressure.  4. There is normal LV mass and concentric remodeling.  5. Normal right ventricular cavity size and normal right ventricular systolic function.  6. Left atrium is normal in size.  7. Mitral valve leaflets have focal calcification.  8. Trace mitral regurgitation.  9. Trileaflet aortic valve with normal systolic excursion. There is focal calcification of the aortic valve leaflets.  10. Estimated pulmonary artery systolic pressure is 13 mmHg, consistent with normal pulmonary artery pressure.  11. Compared to the transthoracic echocardiogram performed on 2024, there have been no significant interval changes    12/10/24 Pt presents today for elective LHC with Dr Richardson. Feeling well, no c/o offered. Denies CP, SOB, palpitations, N/V, fever/chills, abd pain, numbness/tingling/weakness, other c/o at this time.  59 year old male with PMH CAD, HLD, ischemic CVA, Arthralgia of left acromioclavicular joint, OTTONIEL, pseudoaneurysm, Tear of left supraspinatus tendon.    11/15/24 nuclear stress test  Conclusions:  1. Normal myocardial perfusion scan, with no evidence of infarction or inducible ischemia.  2. The EKG changes noted at peak exercise do not fulfill criteria for myocardial ischemia but the test was positive for exercise induced chest pain, relieved with rest.  3. The patient underwent stress testing using the standard Jose protocol.  • The patient exercised for 7 min 31 sec.  • The test was stopped due to chest pain.  • The peak heart rate was 142 bpm; 90 % of predicted maximal heart rate for this patient.  • The patient achieved 10.1 METS which is consistent with good exercise capacity considering age and other clinical characteristics.  4. The post stress left ventricular EF is 72 %. The stress end diastolic volume is 106 ml and systolic volume is 30 ml.    24 cardiology appt with Dr Moore:  His recent blood work demonstrated an A1c of 6.8, and an LDL of 151. His father had significant cardiac issues, with multiple CVA/MI and was disabled by the age of 52, passing away at 78. His mother had diabetes. He works as an  and is not particularly active. At last visit, he was reporting dyspnea one exertion/chest pain, along with some focal neurologic numbness.  In , he had a posterior right temporal CVA, s/p TNK.  Cardiac CTA with , with multivessel CAD. He is now s/p cardiac cath with moderate atherosclerosis. The test was complicated by a right CFA pseudoaneurysm. As per Ultrasound 2024 office visit CFA pseudoaneurysm now resolved. Patient is now on asa and plavix, along with high dose statin and zetia.  He reports chest pain on exertion, and dyspnea. Perfusion was normal on a nuclear stress test, though he had chest pain/dyspnea and very mild EKG changes after 7.3 minutes of exercise Jose protocol. .  CTA of his head noted severe focal right M1 segment MCA stenosis.   EC2024: Sinus rhythm, nonspecific ST abnormality.      24 TTE:  CONCLUSIONS:  1. Technically difficult image quality.  2. Left ventricular systolic function is low normal with an ejection fraction of 52 % by Newman's method of disks.  3. There is mild (grade 1) left ventricular diastolic dysfunction, with normal left ventricular filling pressure.  4. There is normal LV mass and concentric remodeling.  5. Normal right ventricular cavity size and normal right ventricular systolic function.  6. Left atrium is normal in size.  7. Mitral valve leaflets have focal calcification.  8. Trace mitral regurgitation.  9. Trileaflet aortic valve with normal systolic excursion. There is focal calcification of the aortic valve leaflets.  10. Estimated pulmonary artery systolic pressure is 13 mmHg, consistent with normal pulmonary artery pressure.  11. Compared to the transthoracic echocardiogram performed on 2024, there have been no significant interval changes    12/10/24 Pt presents today for elective LHC with Dr Richardson. Feeling well, no c/o offered. Denies CP, SOB, palpitations, N/V, fever/chills, abd pain, numbness/tingling/weakness, other c/o at this time.

## 2024-12-09 NOTE — H&P CARDIOLOGY - NSICDXPASTMEDICALHX_GEN_ALL_CORE_FT
PAST MEDICAL HISTORY:  CVA (cerebrovascular accident)     HLD (hyperlipidemia)     Impingement syndrome of left shoulder     Lumbar herniated disc     OA (osteoarthritis)     OTTONIEL (obstructive sleep apnea)

## 2024-12-10 ENCOUNTER — TRANSCRIPTION ENCOUNTER (OUTPATIENT)
Age: 59
End: 2024-12-10

## 2024-12-10 ENCOUNTER — INPATIENT (INPATIENT)
Facility: HOSPITAL | Age: 59
LOS: 0 days | Discharge: HOME | DRG: 303 | End: 2024-12-11
Attending: INTERNAL MEDICINE | Admitting: INTERNAL MEDICINE
Payer: COMMERCIAL

## 2024-12-10 DIAGNOSIS — Z98.890 OTHER SPECIFIED POSTPROCEDURAL STATES: Chronic | ICD-10-CM

## 2024-12-10 DIAGNOSIS — I25.10 ATHEROSCLEROTIC HEART DISEASE OF NATIVE CORONARY ARTERY WITHOUT ANGINA PECTORIS: ICD-10-CM

## 2024-12-10 LAB
ANION GAP SERPL CALC-SCNC: 5 MMOL/L — SIGNIFICANT CHANGE UP (ref 5–17)
BUN SERPL-MCNC: 18 MG/DL — SIGNIFICANT CHANGE UP (ref 7–23)
CALCIUM SERPL-MCNC: 9 MG/DL — SIGNIFICANT CHANGE UP (ref 8.5–10.1)
CHLORIDE SERPL-SCNC: 111 MMOL/L — HIGH (ref 96–108)
CO2 SERPL-SCNC: 25 MMOL/L — SIGNIFICANT CHANGE UP (ref 22–31)
CREAT SERPL-MCNC: 1.1 MG/DL — SIGNIFICANT CHANGE UP (ref 0.5–1.3)
EGFR: 76 ML/MIN/1.73M2 — SIGNIFICANT CHANGE UP
GLUCOSE SERPL-MCNC: 126 MG/DL — HIGH (ref 70–99)
HCT VFR BLD CALC: 41 % — SIGNIFICANT CHANGE UP (ref 39–50)
HGB BLD-MCNC: 13.8 G/DL — SIGNIFICANT CHANGE UP (ref 13–17)
MCHC RBC-ENTMCNC: 28.8 PG — SIGNIFICANT CHANGE UP (ref 27–34)
MCHC RBC-ENTMCNC: 33.7 G/DL — SIGNIFICANT CHANGE UP (ref 32–36)
MCV RBC AUTO: 85.4 FL — SIGNIFICANT CHANGE UP (ref 80–100)
NRBC # BLD: 0 /100 WBCS — SIGNIFICANT CHANGE UP (ref 0–0)
PLATELET # BLD AUTO: 224 K/UL — SIGNIFICANT CHANGE UP (ref 150–400)
POTASSIUM SERPL-MCNC: 3.9 MMOL/L — SIGNIFICANT CHANGE UP (ref 3.5–5.3)
POTASSIUM SERPL-SCNC: 3.9 MMOL/L — SIGNIFICANT CHANGE UP (ref 3.5–5.3)
RBC # BLD: 4.8 M/UL — SIGNIFICANT CHANGE UP (ref 4.2–5.8)
RBC # FLD: 13.4 % — SIGNIFICANT CHANGE UP (ref 10.3–14.5)
SODIUM SERPL-SCNC: 141 MMOL/L — SIGNIFICANT CHANGE UP (ref 135–145)
WBC # BLD: 7.03 K/UL — SIGNIFICANT CHANGE UP (ref 3.8–10.5)
WBC # FLD AUTO: 7.03 K/UL — SIGNIFICANT CHANGE UP (ref 3.8–10.5)

## 2024-12-10 PROCEDURE — 93458 L HRT ARTERY/VENTRICLE ANGIO: CPT | Mod: 26,59

## 2024-12-10 PROCEDURE — 92928 PRQ TCAT PLMT NTRAC ST 1 LES: CPT | Mod: LC

## 2024-12-10 PROCEDURE — 92978 ENDOLUMINL IVUS OCT C 1ST: CPT | Mod: 26,LC

## 2024-12-10 PROCEDURE — 99152 MOD SED SAME PHYS/QHP 5/>YRS: CPT

## 2024-12-10 RX ORDER — METOPROLOL TARTRATE 100 MG/1
1 TABLET, FILM COATED ORAL
Refills: 0 | DISCHARGE

## 2024-12-10 RX ORDER — METOPROLOL TARTRATE 100 MG/1
25 TABLET, FILM COATED ORAL DAILY
Refills: 0 | Status: DISCONTINUED | OUTPATIENT
Start: 2024-12-10 | End: 2024-12-11

## 2024-12-10 RX ORDER — CLOPIDOGREL 75 MG/1
1 TABLET, FILM COATED ORAL
Qty: 90 | Refills: 2
Start: 2024-12-10 | End: 2025-09-05

## 2024-12-10 RX ORDER — RIBOFLAVIN (VITAMIN B2) 50 MG
1 TABLET ORAL
Refills: 0 | DISCHARGE

## 2024-12-10 RX ORDER — ISOSORBIDE MONONITRATE 10 MG
30 TABLET ORAL DAILY
Refills: 0 | Status: DISCONTINUED | OUTPATIENT
Start: 2024-12-10 | End: 2024-12-11

## 2024-12-10 RX ORDER — EZETIMIBE 10 MG
10 TABLET ORAL AT BEDTIME
Refills: 0 | Status: DISCONTINUED | OUTPATIENT
Start: 2024-12-10 | End: 2024-12-11

## 2024-12-10 RX ORDER — ISOSORBIDE MONONITRATE 10 MG
1 TABLET ORAL
Refills: 0 | DISCHARGE

## 2024-12-10 RX ORDER — SODIUM CHLORIDE 9 MG/ML
75 INJECTION, SOLUTION INTRAMUSCULAR; INTRAVENOUS; SUBCUTANEOUS ONCE
Refills: 0 | Status: DISCONTINUED | OUTPATIENT
Start: 2024-12-10 | End: 2024-12-10

## 2024-12-10 RX ORDER — SODIUM CHLORIDE 9 MG/ML
250 INJECTION, SOLUTION INTRAMUSCULAR; INTRAVENOUS; SUBCUTANEOUS ONCE
Refills: 0 | Status: DISCONTINUED | OUTPATIENT
Start: 2024-12-10 | End: 2024-12-10

## 2024-12-10 RX ORDER — SODIUM CHLORIDE 9 MG/ML
1000 INJECTION, SOLUTION INTRAMUSCULAR; INTRAVENOUS; SUBCUTANEOUS
Refills: 0 | Status: DISCONTINUED | OUTPATIENT
Start: 2024-12-10 | End: 2024-12-11

## 2024-12-10 RX ORDER — EZETIMIBE 10 MG
1 TABLET ORAL
Refills: 0 | DISCHARGE

## 2024-12-10 RX ORDER — SODIUM CHLORIDE 9 MG/ML
1850 INJECTION, SOLUTION INTRAMUSCULAR; INTRAVENOUS; SUBCUTANEOUS ONCE
Refills: 0 | Status: DISCONTINUED | OUTPATIENT
Start: 2024-12-10 | End: 2024-12-10

## 2024-12-10 RX ORDER — CLOPIDOGREL 75 MG/1
75 TABLET, FILM COATED ORAL EVERY 24 HOURS
Refills: 0 | Status: DISCONTINUED | OUTPATIENT
Start: 2024-12-11 | End: 2024-12-11

## 2024-12-10 RX ADMIN — Medication 10 MILLIGRAM(S): at 21:02

## 2024-12-10 RX ADMIN — Medication 80 MILLIGRAM(S): at 21:00

## 2024-12-10 RX ADMIN — SODIUM CHLORIDE 500 MILLILITER(S): 9 INJECTION, SOLUTION INTRAMUSCULAR; INTRAVENOUS; SUBCUTANEOUS at 09:07

## 2024-12-10 RX ADMIN — SODIUM CHLORIDE 75 MILLILITER(S): 9 INJECTION, SOLUTION INTRAMUSCULAR; INTRAVENOUS; SUBCUTANEOUS at 17:13

## 2024-12-10 RX ADMIN — SODIUM CHLORIDE 75 MILLILITER(S): 9 INJECTION, SOLUTION INTRAMUSCULAR; INTRAVENOUS; SUBCUTANEOUS at 09:06

## 2024-12-10 NOTE — DISCHARGE NOTE PROVIDER - PROVIDER TOKENS
FREE:[LAST:[Sammiis],FIRST:[Mumtaz],PHONE:[phone]],FAX:[(   [phone]    -],ADDRESS:[[address] [place] [address]],SCHEDULEDAPPT:[12/19/2024],SCHEDULEDAPPTTIME:[01:45 AM]],PROVIDER:[[id number]],FOLLOWUP:[2 weeks],ESTABLISHEDPATIENT:[T]]

## 2024-12-10 NOTE — ASU PATIENT PROFILE, ADULT - AS SC BRADEN NUTRITION
Continue taking your current medications  Drink plenty of fluids and follow-up with your primary care physician  
(4) excellent

## 2024-12-10 NOTE — PATIENT PROFILE ADULT - FALL HARM RISK - HARM RISK INTERVENTIONS

## 2024-12-10 NOTE — DISCHARGE NOTE PROVIDER - NSDCCPTREATMENT_GEN_ALL_CORE_FT
PRINCIPAL PROCEDURE  Procedure: Percutaneous coronary intervention, primary  Findings and Treatment:      PRINCIPAL PROCEDURE  Procedure: Percutaneous coronary intervention, primary  Findings and Treatment: PCI/FLORES x1 to LCx on 12/10/2024

## 2024-12-10 NOTE — DISCHARGE NOTE PROVIDER - CARE PROVIDER_API CALL
Mumtaz Richardson  [address] [place] [address]  Phone: [phone]  Fax: (   )    -  Scheduled Appointment: 12/19/2024 01:45 AM    Naun Moore  Cardiovascular Disease  [address] [place] [address]  Phone: [phone]  Fax: [phone]  Established Patient  Follow Up Time: 2 weeks

## 2024-12-10 NOTE — DISCHARGE NOTE PROVIDER - HOSPITAL COURSE
60 y/o male , + family hx for CAD (Father with multiple CVA/MIs by age 52), with OTTONIEL, dyslipidemia, known cerebrovascular disease with h/o ischemic posterior right temporal CVA in 05/2024, and known non-obstructive CAD by cardiac catheterization in July, 2024.  His post cath course was complicated by a pseudoaneurysm at RFA access site requiring thrombin injection; outpatient follow up with vascular surgery on 07/12/2024 revealed resolution of PSA.  He presented to cardiologist c/o exertional chest pain.  Subsequent NST on 11/15/2024 revealed normal perfusion scan, however the test was positive for exercise induced angina.  His LVEF was 52% on most recent TTE from 11/22/2024.  He is now s/p elective cardiac catheterization on 12/10/2024 which revealed .  He was admitted to [facility] for observation post PCI. 58 y/o male , + family hx for CAD (Father with multiple CVA/MIs by age 52), with OTTONIEL, dyslipidemia, known cerebrovascular disease with h/o ischemic posterior right temporal CVA in 05/2024, and known non-obstructive CAD by cardiac catheterization in July, 2024.  His post cath course was complicated by a pseudoaneurysm at RFA access site requiring thrombin injection; outpatient follow up with vascular surgery on 07/12/2024 revealed resolution of PSA.  He presented to cardiologist c/o exertional chest pain.  Subsequent NST on 11/15/2024 revealed normal perfusion scan, however the test was positive for exercise induced angina.  His LVEF was 52% on most recent TTE from 11/22/2024.  He is now s/p elective cardiac catheterization on 12/10/2024 which revealed pLAD 60%, LCx 95%, RI diffuse 60% and a dRCA 50%.  He was treated with a successful PCI/FLORES x1.  He was admitted to [facility] for observation post PCI. 58 y/o male , + family hx for CAD (Father with multiple CVA/MIs by age 52), with OTTONIEL, dyslipidemia, known cerebrovascular disease with h/o ischemic posterior right temporal CVA in 05/2024, and known non-obstructive CAD by cardiac catheterization in July, 2024.  His post cath course was complicated by a pseudoaneurysm at RFA access site requiring thrombin injection; outpatient follow up with vascular surgery on 07/12/2024 revealed resolution of PSA.  He presented to cardiologist c/o exertional chest pain.  Subsequent NST on 11/15/2024 revealed normal perfusion scan, however the test was positive for exercise induced angina.  His LVEF was 52% on most recent TTE from 11/22/2024.  He is now s/p elective cardiac catheterization on 12/10/2024 which revealed pLAD 60%, LCx 95%, RI diffuse 60% and a dRCA 50%.  He was treated with a successful PCI/FLORES x1.  He was admitted to [facility] for observation post PCI.     12/11/24 No significant overnight events. [facility] access site stable. Pt remains hemodynamically stable and is cleared for discharge as d/w Dr Richardson.

## 2024-12-10 NOTE — DISCHARGE NOTE PROVIDER - NSTOBACCOUSAGEY/N_GEN_A_CS
I returned Michaela's call and spoke with her regarding her most recent mammogram and ultrasound results.  She is understanding of these findings and does not have any questions or concerns at this time.  Patient states the breast pain has subsided over the last 2 weeks, which is reassuring.  She will try massaging the breast daily to break up scar tissue to see if that helps with the sxs.    Otherwise, I informed the patient that she is due for a bilateral screening mammogram in December.  She is due for follow-up office visit in December, or sooner if any concerns.    All questions and concerns were addressed and answered during this encounter. No further questions or concerns at this time. Patient is encouraged to call the office with any additional questions or concerns.     Demetria Finn PA-C, working with Supervising Physician Dr. Murray Hanson.     
No

## 2024-12-10 NOTE — DISCHARGE NOTE PROVIDER - NSDCFUADDINST_GEN_ALL_CORE_FT
Wound Care:   the day AFTER your procedure...     Remove the bandage from the site and gently clean with soap and water then pat dry; leave open to air.     You may take a brief shower     Do NOT apply lotions, creams, powders, ointments, or perfumes to your incision site unless prescribed by your physician     Do NOT soak your procedure site for 1 week (no baths, no pools, no tubs, etc...)     Check  your groin and /or wrist daily. A small amount of bruising, and soreness are normal    ACTIVITY: for 24 hours      - DO NOT DRIVE     - DO NOT make any important decisions or sign legal documents      - DO NOT operate heavy machinery      - you may resume sexual activity in 48 hours, unless otherwise instructed by your cardiologist          If your procedure was done through the WRIST: for the NEXT 3 DAYS:          - avoid pushing, pulling, with that affected wrist (such as pushing up from a seated position)          - avoid repeated movement of that hand and wrist (such as typing or hammering)          - DO NOT LIFT anything more than 5 pounds         If your procedure was done through the GROIN: for the NEXT 5 DAYS          - Limit climbing stairs, DO NOT soak in bathtub or pool          - no strenous activities, pushing, pulling, straining          - Do not lift anything more than 10 pounds     MEDICATION:      Please take your medications as explained to you (found on your discharge paperwork)      If you received a stent, you will be taking medication to KEEP YOUR STENT OPEN.            You MUST start taking this medication immediately.           Take this medication as prescribed and uninterrupted.            DO NOT STOP taking them for any reason without consulting with your cardiologist first.      **if you have diabetes and take metformin please do not take this medication for 2 days after the procedure. Restart and take as usual starting day 3.    Follow the heart healthy diet recommended by your doctor.   Drink plenty of water for the next 24 hours unless otherwise instructed.  Do not drink any alcoholic beverages for 24 hours (beer, wine, liquor, etc).    If you smoke: STOP SMOKING. Call the [facility] for [employer] at [phone] for assistance.    CALL your cardiologist/primary care doctor to make a follow-up appointment in 2 WEEKS     **CALL YOUR DOCTOR if you experience     fever, chills, body aches, or severe pain, swelling, redness, heat or yellow discharge at incision site     bleeding or excruciating pain at the procedural site, swelling (golf ball size) at your procedural site     CHEST PAIN     numbness, tingling, temperature change (of your procedural site)     Pain  -you may have pain after your surgery or procedure at the puncture site or in the artery/vein that has been treated.  -take pain medication as directed by your doctor.  call your doctor if your pain is not getting better within 5 days or if it gets worse  -prescription pain medication should be taken with food, and can cause constipation, an over-the-counter softener may be helpful    Nausea  -anesthesia/sedation can upset your stomach  -eat bland foods (Jell-o, crackers, toast) and drink ginger ale if you are nauseated  -drink plenty of fluids such as water or ginger ale (unless instructed otherwise by your doctor)  -if you have nausea or vomiting the day after your procedure, call your doctor    Bleeding  -you may have a small amount of oozing from your surgical or procedural site  -bleeding as the site can be dangerous and should prompt immediate medical attention    Infection  -if you have any of the following signs of infection, call your doctor:       redness, swelling, fever over 101 degrees, thick yellow/white drainage    If you are unable to reach your doctor, you may contact:   Dr Mumtaz Richardson @ [phone]    **Call 911 immediately if:     - your hand or leg becomes blue, feels cold to touch, or if you have numbness or tingling     - bleeding or swelling from your wrist or groin site cannot be controlled or if area becomes very red or hot to touch     - you have pain, pressure, tightness or burning in your chest, arms, jaw or stomach; shortness of breath; nausea or excessive sweating; lightheadedness; dizziness or a fainting spell; or if you have sudden back or stomach pain     -you have rapid heartbeat or palpitations     - you have bright red blood in large amounts, severe pain at access site (wrist or groin) or significant new swelling at the puncture site    If/because you had anesthesia, for the next 24 hours you should NOT:  -drive a car, operate power tool or machinery  -drink alcohol, beer, or wine  -make important personal or business decisions  If you had any type of sedation, you may experience lightheadedness, dizziness, or sleepiness following your procedure. A responsible adult should stay with you for at least 24 hours following your procedure.

## 2024-12-10 NOTE — DISCHARGE NOTE PROVIDER - NSDCMRMEDTOKEN_GEN_ALL_CORE_FT
aspirin 81 mg oral delayed release tablet: 1 tab(s) orally once a day Use as Directed MDD: 1  atorvastatin 80 mg oral tablet: 1 tab(s) orally once a day (at bedtime) Use as Directed MDD: 1  clopidogrel 75 mg oral tablet: 1 tab(s) orally once a day Use as Directed MDD: 1  clopidogrel 75 mg oral tablet: 1 tab(s) orally once a day MDD: 75mg  ezetimibe 10 mg oral tablet: 1 tab(s) orally once a day (at bedtime)  isosorbide mononitrate 30 mg oral tablet, extended release: 1 tab(s) orally once a day as needed for  chest pain  magnesium oxide: 400 milligram(s) orally once a day  metoprolol succinate 25 mg oral capsule, extended release: 1 cap(s) orally once a day  riboflavin: 1 tab(s) orally once a day   aspirin 81 mg oral delayed release tablet: 1 tab(s) orally once a day Use as Directed MDD: 1  atorvastatin 80 mg oral tablet: 1 tab(s) orally once a day (at bedtime) Use as Directed MDD: 1  clopidogrel 75 mg oral tablet: 1 tab(s) orally once a day Use as Directed MDD: 1  ezetimibe 10 mg oral tablet: 1 tab(s) orally once a day (at bedtime)  isosorbide mononitrate 30 mg oral tablet, extended release: 1 tab(s) orally once a day as needed for  chest pain  magnesium oxide: 400 milligram(s) orally once a day  metoprolol succinate 25 mg oral capsule, extended release: 1 cap(s) orally once a day  riboflavin: 1 tab(s) orally once a day

## 2024-12-10 NOTE — DISCHARGE NOTE PROVIDER - NSDCFUSCHEDAPPT_GEN_ALL_CORE_FT
Mumtaz Richardson  [facility] Physician [facility]  CARDIOLOGY [address]  Scheduled Appointment: 12/19/2024

## 2024-12-10 NOTE — CHART NOTE - NSCHARTNOTEFT_GEN_A_CORE
Post Diagnostic Cardiac Catheterization Chart Note      Prelim cath report:   LHC via RRA  pLAD 60%  LCx 95%.  Treated with successful PCI/FLORES x1  RI diffuse 60%  dRCA 50%  LVEDP 22 mmHg  full/official report to follow      Patient without complaints. Denies CP, SOB, palpitations, N/V, fever/chills, abd pain, numbness/tingling/weakness, other c/o at this time.    A+O x 3, neurologically intact  RRA access site stable (clean, dry, intact, without bleeding, heat, erythema, or hematoma). Radial band in place.  RUE motor, neuro, circ intact.  Hemodynamically stable, neurologically intact, VS stable, afebrile    Post PCI ECG: No acute changes post PCI    A/P: s/p LHC & PCI    - Admit to CPU for overnight observation post PCI  - Post cath/PCI routine VS, access site, neuro-vascular monitoring and RUE post access precautions ordered  - Post cath access site precautions reviewed with pt who verbalized good understanding  - Post cath hydration as ordered  - Bedrest. May get OOB 30 minutes after radial band removed if wrist and hemodynamics remain stable   - EKG post cath done  - F/U labs and EKG in am  - Continue dual anti platelet therapy with aspirin AND clopidogrel.  DAPT will be e-prescribed to patients preferred pharmacy.   - Pt education provided/reinforced re: importance of strict adherence to uninterrupted DAPT for minimum of 9-12 months (cardiologist will determine duration)  - Patient educated on benefits of Cardiac Rehab Program; referral provided to patient. Referral faxed and copy placed in medical record. Patient given list of locations with phone numbers of local rehab facilities and advised to contact their insurance company for participating providers. Patient educated on need to bring discharge documents including cardiovascular history, medications, and testing/treatments to first appointment.  - Continue statin, ezetimibe, beta blocker, long acting nitrates  - Continue home medications as appropriate  - May resume prior diet  - Lifestyle modifications discussed to reduce cardiovascular risk factors including weight reduction, smoking cessation (referral provided if applicable), medication compliance, and routine follow up with Cardiologist to track your BMI, cholesterol, and glucose levels.   - Discharge in am if stable  - Follow-up on 12/19/2024 at 1:45 pm with Dr Richardson for post PCI check  - Follow-up in 2 weeks with outpatient/referring cardiologist

## 2024-12-11 ENCOUNTER — TRANSCRIPTION ENCOUNTER (OUTPATIENT)
Age: 59
End: 2024-12-11

## 2024-12-11 VITALS — RESPIRATION RATE: 12 BRPM | HEART RATE: 64 BPM | OXYGEN SATURATION: 97 %

## 2024-12-11 LAB
ANION GAP SERPL CALC-SCNC: 6 MMOL/L — SIGNIFICANT CHANGE UP (ref 5–17)
BASOPHILS # BLD AUTO: 0.02 K/UL — SIGNIFICANT CHANGE UP (ref 0–0.2)
BASOPHILS NFR BLD AUTO: 0.3 % — SIGNIFICANT CHANGE UP (ref 0–2)
CHLORIDE SERPL-SCNC: 110 MMOL/L — HIGH (ref 96–108)
CHOLEST SERPL-MCNC: 112 MG/DL — SIGNIFICANT CHANGE UP
CO2 SERPL-SCNC: 26 MMOL/L — SIGNIFICANT CHANGE UP (ref 22–31)
EOSINOPHIL # BLD AUTO: 0.19 K/UL — SIGNIFICANT CHANGE UP (ref 0–0.5)
EOSINOPHIL NFR BLD AUTO: 2.6 % — SIGNIFICANT CHANGE UP (ref 0–6)
HCT VFR BLD CALC: 41.1 % — SIGNIFICANT CHANGE UP (ref 39–50)
HDLC SERPL-MCNC: 42 MG/DL — SIGNIFICANT CHANGE UP
HGB BLD-MCNC: 13.6 G/DL — SIGNIFICANT CHANGE UP (ref 13–17)
IMM GRANULOCYTES NFR BLD AUTO: 0.1 % — SIGNIFICANT CHANGE UP (ref 0–0.9)
LIPID PNL WITH DIRECT LDL SERPL: 49 MG/DL — SIGNIFICANT CHANGE UP
LYMPHOCYTES # BLD AUTO: 1.61 K/UL — SIGNIFICANT CHANGE UP (ref 1–3.3)
LYMPHOCYTES # BLD AUTO: 22 % — SIGNIFICANT CHANGE UP (ref 13–44)
MCHC RBC-ENTMCNC: 28.3 PG — SIGNIFICANT CHANGE UP (ref 27–34)
MCHC RBC-ENTMCNC: 33.1 G/DL — SIGNIFICANT CHANGE UP (ref 32–36)
MCV RBC AUTO: 85.6 FL — SIGNIFICANT CHANGE UP (ref 80–100)
MONOCYTES # BLD AUTO: 0.7 K/UL — SIGNIFICANT CHANGE UP (ref 0–0.9)
MONOCYTES NFR BLD AUTO: 9.6 % — SIGNIFICANT CHANGE UP (ref 2–14)
NEUTROPHILS # BLD AUTO: 4.79 K/UL — SIGNIFICANT CHANGE UP (ref 1.8–7.4)
NEUTROPHILS NFR BLD AUTO: 65.4 % — SIGNIFICANT CHANGE UP (ref 43–77)
NON HDL CHOLESTEROL: 70 MG/DL — SIGNIFICANT CHANGE UP
NRBC # BLD: 0 /100 WBCS — SIGNIFICANT CHANGE UP (ref 0–0)
PLATELET # BLD AUTO: 204 K/UL — SIGNIFICANT CHANGE UP (ref 150–400)
POTASSIUM SERPL-MCNC: 4.1 MMOL/L — SIGNIFICANT CHANGE UP (ref 3.5–5.3)
POTASSIUM SERPL-SCNC: 4.1 MMOL/L — SIGNIFICANT CHANGE UP (ref 3.5–5.3)
RBC # BLD: 4.8 M/UL — SIGNIFICANT CHANGE UP (ref 4.2–5.8)
RBC # FLD: 13.5 % — SIGNIFICANT CHANGE UP (ref 10.3–14.5)
SODIUM SERPL-SCNC: 142 MMOL/L — SIGNIFICANT CHANGE UP (ref 135–145)
TRIGL SERPL-MCNC: 113 MG/DL — SIGNIFICANT CHANGE UP
WBC # BLD: 7.32 K/UL — SIGNIFICANT CHANGE UP (ref 3.8–10.5)
WBC # FLD AUTO: 7.32 K/UL — SIGNIFICANT CHANGE UP (ref 3.8–10.5)

## 2024-12-11 PROCEDURE — 85025 COMPLETE CBC W/AUTO DIFF WBC: CPT

## 2024-12-11 PROCEDURE — 36415 COLL VENOUS BLD VENIPUNCTURE: CPT

## 2024-12-11 PROCEDURE — C1874: CPT

## 2024-12-11 PROCEDURE — 93010 ELECTROCARDIOGRAM REPORT: CPT

## 2024-12-11 PROCEDURE — C1769: CPT

## 2024-12-11 PROCEDURE — 80061 LIPID PANEL: CPT

## 2024-12-11 PROCEDURE — 93458 L HRT ARTERY/VENTRICLE ANGIO: CPT | Mod: 59

## 2024-12-11 PROCEDURE — C9600: CPT | Mod: LC

## 2024-12-11 PROCEDURE — 99222 1ST HOSP IP/OBS MODERATE 55: CPT

## 2024-12-11 PROCEDURE — 92978 ENDOLUMINL IVUS OCT C 1ST: CPT | Mod: LC

## 2024-12-11 PROCEDURE — 80051 ELECTROLYTE PANEL: CPT

## 2024-12-11 PROCEDURE — C1887: CPT

## 2024-12-11 PROCEDURE — 93005 ELECTROCARDIOGRAM TRACING: CPT

## 2024-12-11 PROCEDURE — C1753: CPT

## 2024-12-11 PROCEDURE — 85027 COMPLETE CBC AUTOMATED: CPT

## 2024-12-11 PROCEDURE — C1725: CPT

## 2024-12-11 PROCEDURE — C1894: CPT

## 2024-12-11 PROCEDURE — 80048 BASIC METABOLIC PNL TOTAL CA: CPT

## 2024-12-11 RX ADMIN — METOPROLOL TARTRATE 25 MILLIGRAM(S): 100 TABLET, FILM COATED ORAL at 05:43

## 2024-12-11 RX ADMIN — CLOPIDOGREL 75 MILLIGRAM(S): 75 TABLET, FILM COATED ORAL at 07:57

## 2024-12-11 RX ADMIN — Medication 81 MILLIGRAM(S): at 07:57

## 2024-12-11 NOTE — CONSULT NOTE ADULT - NS ATTEND AMEND GEN_ALL_CORE FT
58y/o M with CAD, CVA, HLD, and OTTONIEL presented for elective LHC.  Follows with Dr. Moore     - Had recent positive Nuclear Stress Test (11/2024)  - s/p elective LHC (12/10/24), found to have  pLAD 60%, LCx 95%, RI diffuse 60% and a dRCA 50%, s/p FLORES x 1 to LCx via RRA  - Continue DAPT with high intensity statin  - Continue home Toprol XL and Imdur  - Had a TTE in May: EF 50-55% with Grade 1DD, no significant valvular disease noted.  - compliance with meds and fu explained to pt.   - Will follow up with Dr. Moore in 2 weeks

## 2024-12-11 NOTE — DISCHARGE NOTE NURSING/CASE MANAGEMENT/SOCIAL WORK - FINANCIAL ASSISTANCE
[facility] provides services at a reduced cost to those who are determined to be eligible through [facility]’s financial assistance program. Information regarding [facility]’s financial assistance program can be found by going to [email or web address] or by calling [phone].

## 2024-12-11 NOTE — PROGRESS NOTE ADULT - ASSESSMENT
60 y/o male , + family hx for CAD (Father with multiple CVA/MIs by age 52), with OTTONIEL, dyslipidemia, known cerebrovascular disease with h/o ischemic posterior right temporal CVA in 05/2024, and known non-obstructive CAD by cardiac catheterization in July, 2024.  His post cath course was complicated by a pseudoaneurysm at RFA access site requiring thrombin injection; outpatient follow up with vascular surgery on 07/12/2024 revealed resolution of PSA.  He presented to cardiologist c/o exertional chest pain.  Subsequent NST on 11/15/2024 revealed normal perfusion scan, however the test was positive for exercise induced angina.  His LVEF was 52% on most recent TTE from 11/22/2024.  He is now s/p elective cardiac catheterization on 12/10/2024 which revealed pLAD 60%, LCx 95%, RI diffuse 60% and a dRCA 50%.  He was treated with a successful PCI/FLORES x1.  He was admitted to CPU for observation post PCI.     pt observed overnight in CPU, no objection to discharge from interventional cardiology perspective   post cath access site precautions reviewed with pt who verbalized good understanding  activity as tolerated (except access site precautions)  am labs and EKG noted  continue dual anti platelet therapy with aspirin AND clopidogrel  Pt education provided/reinforced re: importance of strict adherence to uninterrupted DAPT for minimum of 9-12 months (cardiologist will determine duration)  Patient educated on benefits of Cardiac Rehab Program; referral provided to patient. Referral faxed and copy placed in medical record. Patient given list of locations with phone numbers of local rehab facilities and advised to contact their insurance company for participating providers. Patient educated on need to bring discharge documents including cardiovascular history, medications, and testing/treatments to first appointment.  continue statin, beta blocker  diet as tolerated  Lifestyle modifications discussed to reduce cardiovascular risk factors including weight reduction, smoking cessation (referral provided if applicable), medication compliance, and routine follow up with Cardiologist to track your BMI, cholesterol, and glucose levels.   follow-up next week with Dr Richardson for post PCI check  follow-up in 2 weeks with outpatient/referring cardiologist   cleared for d/c as d/w Dr Richardson

## 2024-12-11 NOTE — CONSULT NOTE ADULT - SUBJECTIVE AND OBJECTIVE BOX
[facility] - Christal Cunningham Pannella, Patel, Savella, Bryan Esparza  Office Number: [phone]    Initial Consult Note: This is a 58 y/o with CAD, CA, HLD, and OTTONIEL presented for elective LHC.  Follows with Dr. Moore    CHIEF COMPLAINT: Patient is a 59y old  Male who presents with a chief complaint of Elective Percutaneous Coronary Intervention (10 Dec 2024 15:45)    HPI:  59 year old male with PMH CAD, HLD, ischemic CVA, Arthralgia of left acromioclavicular joint, OTTONIEL, pseudoaneurysm, Tear of left supraspinatus tendon.    11/15/24 nuclear stress test  Conclusions:  1. Normal myocardial perfusion scan, with no evidence of infarction or inducible ischemia.  2. The EKG changes noted at peak exercise do not fulfill criteria for myocardial ischemia but the test was positive for exercise induced chest pain, relieved with rest.  3. The patient underwent stress testing using the standard Jose protocol.  • The patient exercised for 7 min 31 sec.  • The test was stopped due to chest pain.  • The peak heart rate was 142 bpm; 90 % of predicted maximal heart rate for this patient.  • The patient achieved 10.1 METS which is consistent with good exercise capacity considering age and other clinical characteristics.  4. The post stress left ventricular EF is 72 %. The stress end diastolic volume is 106 ml and systolic volume is 30 ml.    24 cardiology appt with Dr Moore:  His recent blood work demonstrated an A1c of 6.8, and an LDL of 151. His father had significant cardiac issues, with multiple CVA/MI and was disabled by the age of 52, passing away at 78. His mother had diabetes. He works as an  and is not particularly active. At last visit, he was reporting dyspnea one exertion/chest pain, along with some focal neurologic numbness.  In , he had a posterior right temporal CVA, s/p TNK.  Cardiac CTA with , with multivessel CAD. He is now s/p cardiac cath with moderate atherosclerosis. The test was complicated by a right CFA pseudoaneurysm. As per Ultrasound 2024 office visit CFA pseudoaneurysm now resolved. Patient is now on asa and plavix, along with high dose statin and zetia.  He reports chest pain on exertion, and dyspnea. Perfusion was normal on a nuclear stress test, though he had chest pain/dyspnea and very mild EKG changes after 7.3 minutes of exercise Jose protocol. .  CTA of his head noted severe focal right M1 segment MCA stenosis.   EC2024: Sinus rhythm, nonspecific ST abnormality.      24 TTE:  CONCLUSIONS:  1. Technically difficult image quality.  2. Left ventricular systolic function is low normal with an ejection fraction of 52 % by Newman's method of disks.  3. There is mild (grade 1) left ventricular diastolic dysfunction, with normal left ventricular filling pressure.  4. There is normal LV mass and concentric remodeling.  5. Normal right ventricular cavity size and normal right ventricular systolic function.  6. Left atrium is normal in size.  7. Mitral valve leaflets have focal calcification.  8. Trace mitral regurgitation.  9. Trileaflet aortic valve with normal systolic excursion. There is focal calcification of the aortic valve leaflets.  10. Estimated pulmonary artery systolic pressure is 13 mmHg, consistent with normal pulmonary artery pressure.  11. Compared to the transthoracic echocardiogram performed on 2024, there have been no significant interval changes    12/10/24 Pt presents today for elective LHC with Dr Richardson. Feeling well, no c/o offered. Denies CP, SOB, palpitations, N/V, fever/chills, abd pain, numbness/tingling/weakness, other c/o at this time.  (09 Dec 2024 16:37)    PAST MEDICAL & SURGICAL HISTORY:  HLD (hyperlipidemia)      OA (osteoarthritis)      Impingement syndrome of left shoulder      Lumbar herniated disc      CVA (cerebrovascular accident)      OTTONIEL (obstructive sleep apnea)      S/P hernia repair      S/P sinus surgery  H/O colonoscopy    SOCIAL HISTORY:  No tobacco, ethanol, or drug abuse.  FAMILY HISTORY:  Family history of stroke (Father)    Family history of diabetes mellitus (DM) (Mother)    No family history of acute MI or sudden cardiac death.  MEDICATIONS  (STANDING):  aspirin  chewable 81 milliGRAM(s) Oral every 24 hours  atorvastatin 80 milliGRAM(s) Oral at bedtime  clopidogrel Tablet 75 milliGRAM(s) Oral every 24 hours  ezetimibe 10 milliGRAM(s) Oral at bedtime  magnesium oxide 400 milliGRAM(s) Oral daily  metoprolol succinate ER 25 milliGRAM(s) Oral daily  sodium chloride 0.9%. 500 milliLiter(s) (75 mL/Hr) IV Continuous <Continuous>  sodium chloride 0.9%. 1000 milliLiter(s) (75 mL/Hr) IV Continuous <Continuous>    MEDICATIONS  (PRN):  isosorbide   mononitrate ER Tablet (IMDUR) 30 milliGRAM(s) Oral daily PRN for chest pain    Allergies    Vicodin (Rash)    Intolerances    REVIEW OF SYSTEMS:  CONSTITUTIONAL: No weakness, fevers or chills  EYES/ENT: No visual changes;  No vertigo or throat pain   NECK: No pain or stiffness  RESPIRATORY: No cough, wheezing, hemoptysis; No shortness of breath  CARDIOVASCULAR: No chest pain or palpitations  GASTROINTESTINAL: No abdominal pain. No nausea, vomiting, or hematemesis; No diarrhea or constipation. No melena or hematochezia.  GENITOURINARY: No dysuria, frequency or hematuria  NEUROLOGICAL: No numbness or weakness  SKIN: No itching or rash  All other review of systems is negative unless indicated above  VITAL SIGNS:   Vital Signs Last 24 Hrs  T(C): 36.4 (11 Dec 2024 07:38), Max: 36.7 (10 Dec 2024 20:19)  T(F): 97.6 (11 Dec 2024 07:38), Max: 98 (10 Dec 2024 20:19)  HR: 64 (11 Dec 2024 08:00) (57 - 78)  BP: 124/61 (11 Dec 2024 04:00) (116/78 - 163/64)  BP(mean): 77 (11 Dec 2024 04:00) (77 - 120)  RR: 12 (11 Dec 2024 08:00) (12 - 22)  SpO2: 97% (11 Dec 2024 08:00) (95% - 98%)    Parameters below as of 11 Dec 2024 00:00  Patient On (Oxygen Delivery Method): room air    I&O's Summary    10 Dec 2024 07:01  -  11 Dec 2024 07:00  --------------------------------------------------------  IN: 575 mL / OUT: 900 mL / NET: -325 mL    On Exam:  Constitutional: NAD, alert and oriented x 3  Lungs:  Non-labored, breath sounds are clear bilaterally, No wheezing, rales or rhonchi  Cardiovascular: RRR.  S1 and S2 positive.  No murmurs, rubs, gallops or clicks  Gastrointestinal: Bowel Sounds present, soft, nontender.   Lymph: No peripheral edema. No cervical lymphadenopathy.  Neurological: Alert, no focal deficits  Skin: No rashes or ulcers   Psych:  Mood & affect appropriate.    LABS: All Labs Reviewed:                        13.6   7.32  )-----------( 204      ( 11 Dec 2024 06:15 )             41.1                         13.8   7.03  )-----------( 224      ( 10 Dec 2024 08:25 )             41.0     11 Dec 2024 06:15    142    |  110    |  x      ----------------------------<  x      4.1     |  26     |  x      10 Dec 2024 08:25    141    |  111    |  18     ----------------------------<  126    3.9     |  25     |  1.10     Ca    9.0        10 Dec 2024 08:25    RADIOLOGY:    TRANSTHORACIC ECHOCARDIOGRAM REPORT  ________________________________________________________________________________                                      _______       Pt. Name:       ROBERT MAYNE Study Date:    2024  MRN:            [id number]    YOB: 1965  Accession #:    [id number]    Age:           59 years  Account#:       [id number]   Gender:        M  Visit ID#  Heart Rate:                  Height:        70.00 in (177.80 cm)  Rhythm:                      Weight: 233.00 lb (105.69 kg)  Blood Pressure: 133/84 mmHg  BSA/BMI:       2.23 m² / 33.43 kg/m²  ________________________________________________________________________________________  Referring Physician:    Adam Eddy  Interpreting Physician: Monty Alejandre MD  Primary Sonographer:    Raquel Rivera    CPT:               ECHO TTE WO CON COMP W DOPP - 47922.m  Indication(s):     Other cerebrovascular disease - I67.89  Procedure:         Transthoracic echocardiogram with 2-D, M-mode and complete                     spectral and color flow Doppler.  Ordering Location: [facility]  Admission Status:  Inpatient  Study Information: Image quality for this study is technically difficult.    _______________________________________________________________________________________     CONCLUSIONS:      1. Technically difficult image quality.   2. Left ventricular cavity is normal in size. Left ventricular wall thickness is normal. Left ventricular systolic function is low normal with an ejection fraction visually estimated at 50 to 55 %.   3. The left ventricular diastolic function is indeterminate.   4. Normal right ventricular cavity size and normal systolic function.   5. The left atrium is normal in size.   6. The right atrium is normal in size.   7. Aortic valve anatomy cannot be determined with normal systolic excursion.   8. Estimated pulmonary artery systolic pressure is 14 mmHg.   9. No pericardial effusion seen.  10. No prior echocardiogram is available for comparison.  11. Indeterminate saline contrast injection results due to poor image quality.    ________________________________________________________________________________________  FINDINGS:     Left Ventricle:  The left ventricular cavity is normal in size. Left ventricular wall thickness is normal. Left ventricular systolic function is low normal with an ejection fraction visually estimated at 50 to 55%. The left ventricular diastolic function is indeterminate. No left ventricular hypertrophy.     Right Ventricle:  The right ventricular cavity is normal in size and normal systolic function. Tricuspid annular plane systolic excursion (TAPSE) is 2.5 cm (normal >=1.7 cm). Tricuspid annular tissue Doppler S' is 10.0 cm/s (normal >10 cm/s).     Left Atrium:  The left atrium is normal in size with an indexed volume of 21.88 ml/m².     Right Atrium:  The right atrium is normal in size with an indexed volume of 8.77 ml/m² and an indexed area of 4.49 cm²/m².     Interatrial Septum:  Indeterminate saline contrast injection results due to poor image quality.     Aortic Valve:  The aortic valve was not well visualized. The aortic valve anatomy cannot be determined with normal systolic excursion. There is no evidence of aortic regurgitation.     Mitral Valve:  Thereis mild leaflet calcification. There is trace mitral regurgitation.     Tricuspid Valve:  Structurally normal tricuspid valve with normal leaflet excursion. There is trace tricuspid regurgitation. Estimated pulmonary artery systolic pressure is 14 mmHg.     Pulmonic Valve:  Structurally normal pulmonic valve with normal leaflet excursion. There is trace pulmonic regurgitation.     Aorta:  The aortic root at the sinuses of Valsalva is normal in size, measuring 3.15 cm (indexed 1.41 cm/m²).     Pericardium:  No pericardial effusion seen.     Systemic Veins:  The inferior vena cava is normal in size (normal <2.1cm) with abnormal inspiratory collapse (abnormal <50%) consistent with mildly elevated right atrial pressure (~8, range 5-10mmHg).  ____________________________________________________________________  QUANTITATIVE DATA:  Left Ventricle Measurements: (Indexed to BSA)     IVSd (2D):   0.9 cm  LVPWd (2D):  0.9 cm  LVIDd (2D):  4.6 cm  LVIDs (2D):  3.2 cm  LV Mass:     144 g  64.6 g/m²  Visualized LV EF%: 50 to 55%     MV E Vmax:    0.46 m/s  MV A Vmax:    0.57 m/s  MV E/A:       0.80  E/e' lateral: 6.60    Aorta Measurements: (Normal range) (Indexed to BSA)     Sinuses of Valsalva: 3.15 cm (3.1 - 3.7 cm)       Left Atrium Measurements: (Indexed to BSA)  LA Diam 2D:        2.37 cm  LA Vol s, MOD A4C: 43.14 ml.  LA Vol s, MOD A2C: 45.97 ml.  LA Vol s, MOD BP:  48.74 ml  21.88 ml/m²    Right Ventricle Measurements: Right Atrial Measurements:     TAPSE: 2.5 cm                 RA Vol:       19.54 ml                                RA Vol Index: 8.77 ml/m²    LVOT / RVOT/ Qp/Qs Data: (Indexed to BSA)  LVOT Diameter:  2.07 cm  LVOT Area:      3.37 cm²  LVOT Vmax:      0.97 m/s  LVOT Vmn:       0.745 m/s  LVOT VTI:       22.12 cm  LVOT peak grad: 4 mmHg  LVOT SV:        74.4 ml    33.42 ml/m²  LVOT CO:        4.54 l/min 2.04 l/min/m²    Aortic Valve Measurements:  AV Vmax:                1.3 m/s  AV Peak Gradient:       6.3 mmHg  AV Mean Gradient:       3.8 mmHg  AV VTI:          27.2 cm  AV VTI Ratio:           0.81  AoV EOA, Contin:        2.73 cm²  AoV EOA, Contin i:      1.23 cm²/m²  AoV Dimensionless Index 0.81    Mitral Valve Measurements:     MV E Vmax: 0.5 m/s  MV A Vmax: 0.6 m/s  MV E/A:    0.8    Tricuspid Valve Measurements:     TV S'             10.0 cm/s  TR Vmax:          1.2 m/s  TR Peak Gradient: 6.2 mmHg  RA Pressure:      8 mmHg  PASP:             14 mmHg  ________________________________________________________________________________________  Electronically signed on 2024 at 9:11:43 AM by Monty Alejandre MD    *** Final ***  EKG:    Ventricular Rate 58 BPM    Atrial Rate 58 BPM    P-R Interval 136 ms    QRS Duration 86 ms    Q-T Interval 454 ms    QTC Calculation(Bazett) 445 ms    P Axis 72 degrees    R Axis 66 degrees    T Axis 56 degrees    Diagnosis Line Sinus bradycardia  Otherwise normal ECG  Confirmed by JOSEFA NAVA ([id number]) on 12/10/2024 12:18:48 PM

## 2024-12-11 NOTE — CONSULT NOTE ADULT - ASSESSMENT
Assessment/Plan:  60y/o M with CAD, CVA, HLD, and OTTONIEL presented for elective LHC.  Follows with Dr. Moore    CAD  - Had recent positive Nuclear Stress Test (11/2024)  - s/p elective LHC (12/10/24), found to have  pLAD 60%, LCx 95%, RI diffuse 60% and a dRCA 50%, s/p FLORES x 1 to LCx via RRA  - Continue DAPT with high intensity statin  - Continue home Toprol XL and Imdur    - No arrhythmias overnight  - No anginal complaints  - No evidence of volume overload.  Had a TTE in May: EF 50-55% with Grade 1DD, no significant valvular disease noted.    - Will follow up with Dr. Moore in 2 weeks    Macy Roger DNP, NP-C, AGACNP-C  Cardiology  Call TEAMS

## 2024-12-11 NOTE — DISCHARGE NOTE NURSING/CASE MANAGEMENT/SOCIAL WORK - PATIENT PORTAL LINK FT
You can access the FollowMyHealth Patient Portal offered by [facility] by registering at the following website: [email or web address]. By joining [employer]’s FollowMyHealth portal, you will also be able to view your health information using other applications (apps) compatible with our system.

## 2024-12-11 NOTE — PROGRESS NOTE ADULT - SUBJECTIVE AND OBJECTIVE BOX
Department of Cardiology                                                                     Division of Interventional Cardiology                                                                  [facility]/ [address] [address]                                                                             Telephone: [phone]                                                    Post- Procedure Note: Left Heart Cardiac Catheterization       Prelim cath report:  LHC via RRA  pLAD 60%  LCx 95%.  Treated with successful PCI/FLORES x1  RI diffuse 60%  dRCA 50%  LVEDP 22 mmHg  full report to follow    Subjective/ROS: no c/o offered. Denies CP, palpitations, SOB, N/V, fever/chills, dizziness, weakness, numbness/tingling.      HPI:  59 year old male with PMH CAD, HLD, ischemic CVA, Arthralgia of left acromioclavicular joint, OTTONIEL, pseudoaneurysm, Tear of left supraspinatus tendon.    11/15/24 nuclear stress test  Conclusions:  1. Normal myocardial perfusion scan, with no evidence of infarction or inducible ischemia.  2. The EKG changes noted at peak exercise do not fulfill criteria for myocardial ischemia but the test was positive for exercise induced chest pain, relieved with rest.  3. The patient underwent stress testing using the standard Jose protocol.  • The patient exercised for 7 min 31 sec.  • The test was stopped due to chest pain.  • The peak heart rate was 142 bpm; 90 % of predicted maximal heart rate for this patient.  • The patient achieved 10.1 METS which is consistent with good exercise capacity considering age and other clinical characteristics.  4. The post stress left ventricular EF is 72 %. The stress end diastolic volume is 106 ml and systolic volume is 30 ml.    24 cardiology appt with Dr Moore:  His recent blood work demonstrated an A1c of 6.8, and an LDL of 151. His father had significant cardiac issues, with multiple CVA/MI and was disabled by the age of 52, passing away at 78. His mother had diabetes. He works as an  and is not particularly active. At last visit, he was reporting dyspnea one exertion/chest pain, along with some focal neurologic numbness.  In , he had a posterior right temporal CVA, s/p TNK.  Cardiac CTA with , with multivessel CAD. He is now s/p cardiac cath with moderate atherosclerosis. The test was complicated by a right CFA pseudoaneurysm. As per Ultrasound 2024 office visit CFA pseudoaneurysm now resolved. Patient is now on asa and plavix, along with high dose statin and zetia.  He reports chest pain on exertion, and dyspnea. Perfusion was normal on a nuclear stress test, though he had chest pain/dyspnea and very mild EKG changes after 7.3 minutes of exercise Jose protocol. .  CTA of his head noted severe focal right M1 segment MCA stenosis.   EC2024: Sinus rhythm, nonspecific ST abnormality.    24 TTE:  CONCLUSIONS:  1. Technically difficult image quality.  2. Left ventricular systolic function is low normal with an ejection fraction of 52 % by Newman's method of disks.  3. There is mild (grade 1) left ventricular diastolic dysfunction, with normal left ventricular filling pressure.  4. There is normal LV mass and concentric remodeling.  5. Normal right ventricular cavity size and normal right ventricular systolic function.  6. Left atrium is normal in size.  7. Mitral valve leaflets have focal calcification.  8. Trace mitral regurgitation.  9. Trileaflet aortic valve with normal systolic excursion. There is focal calcification of the aortic valve leaflets.  10. Estimated pulmonary artery systolic pressure is 13 mmHg, consistent with normal pulmonary artery pressure.  11. Compared to the transthoracic echocardiogram performed on 2024, there have been no significant interval changes    12/10/24 Pt presents today for elective LHC with Dr Richardson. Feeling well, no c/o offered. Denies CP, SOB, palpitations, N/V, fever/chills, abd pain, numbness/tingling/weakness, other c/o at this time.  (09 Dec 2024 16:37)      PAST MEDICAL & SURGICAL HISTORY:  HLD (hyperlipidemia)  OA (osteoarthritis)  Impingement syndrome of left shoulder  Lumbar herniated disc  CVA (cerebrovascular accident)  OTTONIEL (obstructive sleep apnea)  S/P hernia repair  S/P sinus surgery  H/O colonoscopy      MEDICATIONS  (STANDING):  aspirin  chewable 81 milliGRAM(s) Oral every 24 hours  atorvastatin 80 milliGRAM(s) Oral at bedtime  clopidogrel Tablet 75 milliGRAM(s) Oral every 24 hours  ezetimibe 10 milliGRAM(s) Oral at bedtime  magnesium oxide 400 milliGRAM(s) Oral daily  metoprolol succinate ER 25 milliGRAM(s) Oral daily    MEDICATIONS  (PRN):  isosorbide   mononitrate ER Tablet (IMDUR) 30 milliGRAM(s) Oral daily PRN for chest pain    Allergies: Vicodin (Rash)                          13.6   7.32  )-----------( 204      ( 11 Dec 2024 06:15 )             41.1         142  |  110[H]  |  x   ----------------------------<  x   4.1   |  26  |  x     Ca    9.0      10 Dec 2024 08:25      < from: 12 Lead ECG (12.10.24 @ 08:27) >  Ventricular Rate 58 BPM  Atrial Rate 58 BPM  P-R Interval 136 ms  QRS Duration 86 ms  Q-T Interval 454 ms  QTC Calculation(Bazett) 445 ms  P Axis 72 degrees  R Axis 66 degrees  T Axis 56 degrees  Diagnosis Line Sinus bradycardia  Otherwise normal ECG  < end of copied text >      Vital Signs Last 24 Hrs  T(C): 36.4 (11 Dec 2024 07:38), Max: 36.7 (10 Dec 2024 08:32)  T(F): 97.6 (11 Dec 2024 07:38), Max: 98 (10 Dec 2024 08:32)  HR: 60 (11 Dec 2024 04:00) (56 - 78)  Tele: SR 50-70s, no ectopy  BP: 124/61 (11 Dec 2024 04:00) (114/61 - 163/64)  BP(mean): 77 (11 Dec 2024 04:00) (77 - 120)  RR: 16 (11 Dec 2024 04:00) (12 - 22)  SpO2: 95% (11 Dec 2024 04:00) (95% - 98%) room air      Constitutional: NAD  Neuro: A+O x 3, non-focal, speech clear and intact  HEENT: NC/AT, PERRL, EOMI, anicteric sclerae, oral mucosa pink and moist  Neck: supple, no JVD  CV: regular rate, regular rhythm, +S1S2, no murmurs or rub  Pulm/chest: lung sounds CTA and equal bilaterally, no accessory muscle use noted  Abd: soft, NT, ND, +BS  Ext: DUCKWORTH x 4, no C/C/E  Access site: R wrist C/D/I/soft without hematoma, distal motor/neuro/circ intact. R radial pulse 2+.  Skin: warm, well perfused  Psych: calm, appropriate affect

## 2024-12-19 ENCOUNTER — NON-APPOINTMENT (OUTPATIENT)
Age: 59
End: 2024-12-19

## 2024-12-19 ENCOUNTER — APPOINTMENT (OUTPATIENT)
Dept: CARDIOLOGY | Facility: CLINIC | Age: 59
End: 2024-12-19
Payer: COMMERCIAL

## 2024-12-19 VITALS
TEMPERATURE: 97.7 F | OXYGEN SATURATION: 95 % | WEIGHT: 228 LBS | HEIGHT: 73 IN | SYSTOLIC BLOOD PRESSURE: 122 MMHG | HEART RATE: 75 BPM | DIASTOLIC BLOOD PRESSURE: 72 MMHG | BODY MASS INDEX: 30.22 KG/M2

## 2024-12-19 DIAGNOSIS — E78.5 HYPERLIPIDEMIA, UNSPECIFIED: ICD-10-CM

## 2024-12-19 DIAGNOSIS — I25.10 ATHEROSCLEROTIC HEART DISEASE OF NATIVE CORONARY ARTERY W/OUT ANGINA PECTORIS: ICD-10-CM

## 2024-12-19 PROCEDURE — 93000 ELECTROCARDIOGRAM COMPLETE: CPT

## 2024-12-19 PROCEDURE — 99214 OFFICE O/P EST MOD 30 MIN: CPT

## 2025-02-04 ENCOUNTER — NON-APPOINTMENT (OUTPATIENT)
Age: 60
End: 2025-02-04

## 2025-02-04 ENCOUNTER — APPOINTMENT (OUTPATIENT)
Dept: CARDIOLOGY | Facility: CLINIC | Age: 60
End: 2025-02-04
Payer: COMMERCIAL

## 2025-02-04 VITALS
OXYGEN SATURATION: 94 % | BODY MASS INDEX: 31.54 KG/M2 | HEIGHT: 73 IN | DIASTOLIC BLOOD PRESSURE: 77 MMHG | SYSTOLIC BLOOD PRESSURE: 128 MMHG | WEIGHT: 238 LBS | HEART RATE: 64 BPM

## 2025-02-04 DIAGNOSIS — I25.10 ATHEROSCLEROTIC HEART DISEASE OF NATIVE CORONARY ARTERY W/OUT ANGINA PECTORIS: ICD-10-CM

## 2025-02-04 DIAGNOSIS — E78.5 HYPERLIPIDEMIA, UNSPECIFIED: ICD-10-CM

## 2025-02-04 PROCEDURE — 93000 ELECTROCARDIOGRAM COMPLETE: CPT

## 2025-02-04 PROCEDURE — 99214 OFFICE O/P EST MOD 30 MIN: CPT

## 2025-02-04 RX ORDER — EZETIMIBE 10 MG/1
10 TABLET ORAL DAILY
Refills: 0 | Status: ACTIVE | COMMUNITY

## 2025-02-11 ENCOUNTER — RX RENEWAL (OUTPATIENT)
Age: 60
End: 2025-02-11

## 2025-02-11 LAB
ALBUMIN SERPL ELPH-MCNC: 4.4 G/DL
ALP BLD-CCNC: 114 U/L
ALT SERPL-CCNC: 38 U/L
ANION GAP SERPL CALC-SCNC: 11 MMOL/L
AST SERPL-CCNC: 31 U/L
BILIRUB SERPL-MCNC: 1 MG/DL
BUN SERPL-MCNC: 18 MG/DL
CALCIUM SERPL-MCNC: 9.6 MG/DL
CHLORIDE SERPL-SCNC: 105 MMOL/L
CO2 SERPL-SCNC: 25 MMOL/L
CREAT SERPL-MCNC: 1.1 MG/DL
EGFR: 76 ML/MIN/1.73M2
ESTIMATED AVERAGE GLUCOSE: 137 MG/DL
GLUCOSE SERPL-MCNC: 124 MG/DL
HBA1C MFR BLD HPLC: 6.4 %
LDLC SERPL DIRECT ASSAY-MCNC: 56 MG/DL
POTASSIUM SERPL-SCNC: 4.8 MMOL/L
PROT SERPL-MCNC: 6.6 G/DL
SODIUM SERPL-SCNC: 141 MMOL/L

## 2025-02-14 ENCOUNTER — EMERGENCY (EMERGENCY)
Facility: HOSPITAL | Age: 60
LOS: 1 days | Discharge: HOME | End: 2025-02-14
Attending: STUDENT IN AN ORGANIZED HEALTH CARE EDUCATION/TRAINING PROGRAM
Payer: COMMERCIAL

## 2025-02-14 VITALS
OXYGEN SATURATION: 97 % | TEMPERATURE: 98 F | HEART RATE: 60 BPM | DIASTOLIC BLOOD PRESSURE: 83 MMHG | RESPIRATION RATE: 18 BRPM | SYSTOLIC BLOOD PRESSURE: 136 MMHG

## 2025-02-14 DIAGNOSIS — Z98.890 OTHER SPECIFIED POSTPROCEDURAL STATES: Chronic | ICD-10-CM

## 2025-02-14 PROCEDURE — 99283 EMERGENCY DEPT VISIT LOW MDM: CPT

## 2025-02-14 PROCEDURE — 99282 EMERGENCY DEPT VISIT SF MDM: CPT

## 2025-02-14 NOTE — ED PROVIDER NOTE - PHYSICAL EXAMINATION
· CONSTITUTIONAL: In no apparent distress.  · HEENMT: Airway patent, normal appearing mouth, nose, throat, neck supple with full range of motion, no cervical adenopathy.  · EYES: Pupils equal, round and reactive to light, Extra-ocular movement intact, eyes are clear b/l  · CARDIAC: Regular rate and rhythm, Heart sounds S1 S2 present, no murmurs, rubs or gallops  · RESPIRATORY: No respiratory distress. No stridor, Lungs sounds clear with good aeration bilaterally.  · GASTROINTESTINAL: Abdomen soft, non-tender, non-distended, no rebound, no guarding and no masses. no hepatosplenomegaly.  · MUSCULOSKELETAL: Spine appears normal, movement of extremities grossly intact.  · NEUROLOGICAL: Alert and interactive, no focal deficits  · SKIN: No cyanosis, no pallor, no jaundice, no rash  · PSYCHIATRIC: Alert and oriented to person, place and time. Normal mood and affect, no apparent risk to self or others  · HEME LYMPH: No pallor, No splenomegaly

## 2025-02-14 NOTE — ED PROVIDER NOTE - OBJECTIVE STATEMENT
60 y/o M PMHx of CVA on AC, HLD, OTTONIEL, Impingement syndrome of left shoulder presents with 2nd/3rd digit left hand discoloration, paresthesia, numbness. States was leaving supermarket and noted discoloration in 2/3 digit of left hand. Symptoms last approximately 1 hr, with complete resolution. Contacted PCP and told to come in for evaluation. With no other complaints.

## 2025-02-14 NOTE — ED PROVIDER NOTE - PATIENT PORTAL LINK FT
You can access the FollowMyHealth Patient Portal offered by [facility] by registering at the following website: [email or web address]. By joining "[employer]’s FollowMyHealth portal, you will also be able to view your health information using other applications (apps) compatible with our system.

## 2025-02-14 NOTE — ED ADULT TRIAGE NOTE - CHIEF COMPLAINT QUOTE
Pt walks in c/o discoloration and numbness to first 2 finger of left hand. Pt had CVA in May and had same discoloration and numbness in May before CVA. Fingers are WDL normal to color at this time and <2 second cap refill. Pt on Plavix and 81mg ASA

## 2025-02-14 NOTE — ED PROVIDER NOTE - CLINICAL SUMMARY MEDICAL DECISION MAKING FREE TEXT BOX
58 y/o M PMHx of CVA on AC, HLD, OTTONIEL, Impingement syndrome of left shoulder presents with 2nd/3rd digit left hand discoloration, paresthesia, numbness for approximately 1 hr with resolution of symptoms before presentation. Physical exam unremarkable. Likely reynauds.

## 2025-02-14 NOTE — ED PROVIDER NOTE - NSFOLLOWUPINSTRUCTIONS_ED_ALL_ED_FT
Raynaud's Phenomenon    Raynaud's phenomenon is a condition that affects the blood vessels (arteries) that carry blood to the fingers and toes. The arteries that supply blood to the ears, lips, nipples, or the tip of the nose might also be affected. Raynaud's phenomenon causes the arteries to become narrow temporarily (spasm). As a result, the flow of blood to the affected areas is temporarily decreased. This usually occurs in response to cold temperatures or stress. During an attack, the skin in the affected areas turns white, then blue, and finally red. A person may also feel tingling or numbness in those areas.    Attacks usually last for only a brief period, and then the blood flow to the area returns to normal. In most cases, Raynaud's phenomenon does not cause serious health problems.    What are the causes?  In many cases, the cause of this condition is not known. The condition may occur on its own (primary Raynaud's phenomenon) or may be associated with other diseases or factors (secondary Raynaud's phenomenon).    Possible causes may include:  Diseases or medical conditions that damage the arteries.  Injuries and repetitive actions that hurt the hands or feet.  Being exposed to certain chemicals.  Taking medicines that narrow the arteries.  Other medical conditions, such as lupus, scleroderma, rheumatoid arthritis, thyroid problems, blood disorders, Sjogren syndrome, or atherosclerosis.  What increases the risk?  The following factors may make you more likely to develop this condition:  Being 20–40 years old.  Being female.  Having a family history of Raynaud's phenomenon.  Living in a cold climate.  Smoking.  What are the signs or symptoms?  Symptoms of this condition usually occur when you are exposed to cold temperatures or when you have emotional stress. The symptoms may last for a few minutes or up to several hours. They usually affect your fingers but may also affect your toes, nipples, lips, ears, or the tip of your nose. Symptoms may include:  Changes in skin color. The skin in the affected areas will turn pale or white. The skin may then change from white to bluish to red as normal blood flow returns to the area.  Numbness, tingling, or pain in the affected areas.  In severe cases, symptoms may include:  Skin sores.  Tissues decaying and dying (gangrene).  How is this diagnosed?  This condition may be diagnosed based on:  Your symptoms and medical history.  A physical exam. During the exam, you may be asked to put your hands in cold water to check for a reaction to cold temperature.  Tests, such as:  Blood tests to check for other diseases or conditions.  A test to check the movement of blood through your arteries and veins (vascular ultrasound).  A test in which the skin at the base of your fingernail is examined under a microscope (nailfold capillaroscopy).  How is this treated?  During an episode, you can take actions to help symptoms go away faster. Options include moving your arms around in a windmill pattern, warming your fingers under warm water, or placing your fingers in a warm body fold, such as your armpit.    Long-term treatment for this condition often involves making lifestyle changes and taking steps to control your exposure to cold temperature. For more severe cases, medicine (calcium channel blockers) may be used to improve blood circulation.    Follow these instructions at home:  Avoiding cold temperatures    Take these steps to avoid exposure to cold:  If possible, stay indoors during cold weather.  When you go outside during cold weather, dress in layers and wear mittens, a hat, a scarf, and warm footwear.  Wear mittens or gloves when handling ice or frozen food.  Use holders for glasses or cans containing cold drinks.  Let warm water run for a while before taking a shower or bath.  Warm up the car before driving in cold weather.  Lifestyle    If possible, avoid stressful and emotional situations. Try to find ways to manage your stress, such as:  Exercise.  Yoga.  Meditation.  Biofeedback.  Do not use any products that contain nicotine or tobacco. These products include cigarettes, chewing tobacco, and vaping devices, such as e-cigarettes. If you need help quitting, ask your health care provider.  Avoid secondhand smoke.  Limit your use of caffeine.  Switch to decaffeinated coffee, tea, and soda.  Avoid chocolate.  Avoid vibrating tools and machinery.  General instructions    Protect your hands and feet from injuries, cuts, or bruises.  Avoid wearing tight rings or wristbands.  Wear loose fitting socks and comfortable, roomy shoes.  Take over-the-counter and prescription medicines only as told by your health care provider.  Where to find support  Raynaud's Association: www.raynauds.org  Where to find more information  [employer] [place] of Arthritis and Musculoskeletal and Skin Diseases: www.niams.nih.gov  Contact a health care provider if:  Your discomfort becomes worse despite lifestyle changes.  You develop sores on your fingers or toes that do not heal.  You have breaks in the skin on your fingers or toes.  You have a fever.  You have pain or swelling in your joints.  You have a rash.  Your symptoms occur on only one side of your body.  Get help right away if:  Your fingers or toes turn black.  You have severe pain in the affected areas.  These symptoms may represent a serious problem that is an emergency. Do not wait to see if the symptoms will go away. Get medical help right away. Call your local emergency services (911 in the [place] Do not drive yourself to the hospital.    Summary  Raynaud's phenomenon is a condition that affects the arteries that carry blood to the fingers, toes, ears, lips, nipples, or the tip of the nose.  In many cases, the cause of this condition is not known.  Symptoms of this condition include changes in skin color along with numbness and tingling in the affected area.  Treatment for this condition includes lifestyle changes and reducing exposure to cold temperatures. Medicines may be used for severe cases of the condition.  Contact your health care provider if your condition worsens despite treatment.  This information is not intended to replace advice given to you by your health care provider. Make sure you discuss any questions you have with your health care provider.

## 2025-02-14 NOTE — ED PROVIDER NOTE - CARE PROVIDER_API CALL
Mayuri Crandall  Vascular Surgery  [address]  [place] [address]  Phone: [phone]  Fax: [phone]  Follow Up Time:

## 2025-02-14 NOTE — ED PROVIDER NOTE - ATTENDING CONTRIBUTION TO CARE
59y M w/ hx CVA, CAD, HLD, presents for paresthesias. Pt reports noticing tingling sensation to left 2nd and 3rd fingers earlier this evening while out shopping. Later noted whitish discoloration of the fingers (has picture on cell phone). No pain. Symptoms now completely resolved. On my exam pt in no distress, no focal neuro deficits, strength/sensation grossly intact, no cyanosis of hand/fingers, 2+ radial pulse. Episode consistent with Raynaud phenomenon. No indication for further workup at this time. Will give referral to vascular surgeon. Stable for discharge with return precautions.

## 2025-05-02 ENCOUNTER — RX RENEWAL (OUTPATIENT)
Age: 60
End: 2025-05-02

## 2025-05-08 ENCOUNTER — RX RENEWAL (OUTPATIENT)
Age: 60
End: 2025-05-08

## 2025-06-06 ENCOUNTER — NON-APPOINTMENT (OUTPATIENT)
Age: 60
End: 2025-06-06

## 2025-06-06 ENCOUNTER — APPOINTMENT (OUTPATIENT)
Dept: CARDIOLOGY | Facility: CLINIC | Age: 60
End: 2025-06-06
Payer: COMMERCIAL

## 2025-06-06 VITALS
HEART RATE: 66 BPM | BODY MASS INDEX: 32.47 KG/M2 | WEIGHT: 245 LBS | OXYGEN SATURATION: 96 % | DIASTOLIC BLOOD PRESSURE: 85 MMHG | HEIGHT: 73 IN | SYSTOLIC BLOOD PRESSURE: 131 MMHG

## 2025-06-06 PROCEDURE — 99214 OFFICE O/P EST MOD 30 MIN: CPT | Mod: 25

## 2025-06-06 PROCEDURE — 93000 ELECTROCARDIOGRAM COMPLETE: CPT

## 2025-07-26 ENCOUNTER — RX RENEWAL (OUTPATIENT)
Age: 60
End: 2025-07-26

## (undated) DEVICE — ELCTR WAND AMBIENT MEGA 90DRG

## (undated) DEVICE — SOL IRR BAG NS 0.9% 3000ML

## (undated) DEVICE — GLV 8 PROTEXIS (BLUE)

## (undated) DEVICE — SUT ORTHOCORD COMPOSITE

## (undated) DEVICE — DRAPE MAYO STAND 23"

## (undated) DEVICE — CANNULA LINVATEC SHOULDER 7CM (GREY & ORANGE)

## (undated) DEVICE — CANNULA S&N ARTHROSCOPY W OBTURATOR 8MM

## (undated) DEVICE — CAM-ESU 1501291: Type: DURABLE MEDICAL EQUIPMENT

## (undated) DEVICE — GLV 7.5 PROTEXIS (WHITE)

## (undated) DEVICE — DRSG STERISTRIPS 0.5 X 4"

## (undated) DEVICE — ELCTR GROUNDING PAD ADULT COVIDIEN

## (undated) DEVICE — NDL SPINAL 18G X 3.5" (PINK)

## (undated) DEVICE — VENODYNE/SCD SLEEVE CALF MEDIUM

## (undated) DEVICE — POSITIONER S&N FACE MASK SPIDER 2

## (undated) DEVICE — DRSG CURITY GAUZE SPONGE 4 X 4" 12-PLY

## (undated) DEVICE — BUR S&N STONECUTTER ELITE 4MM STRAIGHT (MAROON)

## (undated) DEVICE — ELCTR S&N SWITCHPEN WITH L-HOOK FOR FLUID

## (undated) DEVICE — SUT PROLENE 0 30" CT-1

## (undated) DEVICE — SYR LUER LOK 50CC

## (undated) DEVICE — WARMING BLANKET LOWER ADULT

## (undated) DEVICE — DRSG STOCKINETTE TUBULAR COTTON 3" NS

## (undated) DEVICE — SUT PDS II 1 96" XLH

## (undated) DEVICE — POSITIONER PATIENT SAFETY STRAP 3X60"

## (undated) DEVICE — DRSG TAPE MICROFOAM 4"

## (undated) DEVICE — SHAVER BLADE S&N FULL RADIUS BONECUTTER PLATINUM 4.5MM (YELLOW)

## (undated) DEVICE — KNIFE S&N ACUFEX BANANA SERRATED 3MM STRAIGHT

## (undated) DEVICE — PACK SHOULDER

## (undated) DEVICE — TUBING DEPUY MITEK FMS OUTFLOW

## (undated) DEVICE — TUBING DEPUY MITEK FMS INFLOW

## (undated) DEVICE — SUT MONOCRYL 3-0 18" PS-2 UNDYED